# Patient Record
Sex: FEMALE | Race: WHITE | NOT HISPANIC OR LATINO | Employment: OTHER | URBAN - METROPOLITAN AREA
[De-identification: names, ages, dates, MRNs, and addresses within clinical notes are randomized per-mention and may not be internally consistent; named-entity substitution may affect disease eponyms.]

---

## 2019-04-30 ENCOUNTER — THERAPY VISIT (OUTPATIENT)
Dept: PHYSICAL THERAPY | Facility: CLINIC | Age: 74
End: 2019-04-30
Payer: COMMERCIAL

## 2019-04-30 DIAGNOSIS — M54.2 CERVICAL PAIN (NECK): ICD-10-CM

## 2019-04-30 PROCEDURE — 97112 NEUROMUSCULAR REEDUCATION: CPT | Mod: GP | Performed by: PHYSICAL THERAPIST

## 2019-04-30 PROCEDURE — 97161 PT EVAL LOW COMPLEX 20 MIN: CPT | Mod: GP | Performed by: PHYSICAL THERAPIST

## 2019-04-30 PROCEDURE — 97110 THERAPEUTIC EXERCISES: CPT | Mod: GP | Performed by: PHYSICAL THERAPIST
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
## 2019-05-07 ENCOUNTER — THERAPY VISIT (OUTPATIENT)
Dept: PHYSICAL THERAPY | Facility: CLINIC | Age: 74
End: 2019-05-07
Payer: COMMERCIAL

## 2019-05-07 DIAGNOSIS — M54.2 CERVICAL PAIN (NECK): ICD-10-CM

## 2019-05-07 PROCEDURE — 97140 MANUAL THERAPY 1/> REGIONS: CPT | Mod: GP | Performed by: PHYSICAL THERAPIST

## 2019-05-07 PROCEDURE — 97110 THERAPEUTIC EXERCISES: CPT | Mod: GP | Performed by: PHYSICAL THERAPIST

## 2019-05-13 ENCOUNTER — THERAPY VISIT (OUTPATIENT)
Dept: PHYSICAL THERAPY | Facility: CLINIC | Age: 74
End: 2019-05-13
Payer: COMMERCIAL

## 2019-05-13 DIAGNOSIS — M54.2 CERVICAL PAIN (NECK): ICD-10-CM

## 2019-05-13 PROCEDURE — 97140 MANUAL THERAPY 1/> REGIONS: CPT | Mod: GP | Performed by: PHYSICAL THERAPIST

## 2019-05-13 PROCEDURE — 97110 THERAPEUTIC EXERCISES: CPT | Mod: GP | Performed by: PHYSICAL THERAPIST

## 2019-05-13 PROCEDURE — 97112 NEUROMUSCULAR REEDUCATION: CPT | Mod: GP | Performed by: PHYSICAL THERAPIST

## 2019-05-28 ENCOUNTER — THERAPY VISIT (OUTPATIENT)
Dept: PHYSICAL THERAPY | Facility: CLINIC | Age: 74
End: 2019-05-28
Payer: COMMERCIAL

## 2019-05-28 DIAGNOSIS — M54.2 CERVICAL PAIN (NECK): ICD-10-CM

## 2019-05-28 PROCEDURE — 97112 NEUROMUSCULAR REEDUCATION: CPT | Mod: GP | Performed by: PHYSICAL THERAPIST

## 2019-05-28 PROCEDURE — 97140 MANUAL THERAPY 1/> REGIONS: CPT | Mod: GP | Performed by: PHYSICAL THERAPIST

## 2019-05-28 PROCEDURE — 97110 THERAPEUTIC EXERCISES: CPT | Mod: GP | Performed by: PHYSICAL THERAPIST

## 2019-06-13 ENCOUNTER — THERAPY VISIT (OUTPATIENT)
Dept: PHYSICAL THERAPY | Facility: CLINIC | Age: 74
End: 2019-06-13
Payer: COMMERCIAL

## 2019-06-13 DIAGNOSIS — M54.2 CERVICAL PAIN (NECK): ICD-10-CM

## 2019-06-13 PROCEDURE — 97112 NEUROMUSCULAR REEDUCATION: CPT | Mod: GP | Performed by: PHYSICAL THERAPIST

## 2019-06-13 PROCEDURE — 97140 MANUAL THERAPY 1/> REGIONS: CPT | Mod: GP | Performed by: PHYSICAL THERAPIST

## 2019-06-13 PROCEDURE — 97110 THERAPEUTIC EXERCISES: CPT | Mod: GP | Performed by: PHYSICAL THERAPIST

## 2019-06-17 ENCOUNTER — THERAPY VISIT (OUTPATIENT)
Dept: PHYSICAL THERAPY | Facility: CLINIC | Age: 74
End: 2019-06-17
Payer: COMMERCIAL

## 2019-06-17 DIAGNOSIS — M54.2 CERVICAL PAIN (NECK): ICD-10-CM

## 2019-06-17 PROCEDURE — 97110 THERAPEUTIC EXERCISES: CPT | Mod: GP | Performed by: PHYSICAL THERAPIST

## 2019-06-17 PROCEDURE — 97140 MANUAL THERAPY 1/> REGIONS: CPT | Mod: GP | Performed by: PHYSICAL THERAPIST

## 2019-06-17 PROCEDURE — 97112 NEUROMUSCULAR REEDUCATION: CPT | Mod: GP | Performed by: PHYSICAL THERAPIST

## 2019-06-17 NOTE — PROGRESS NOTES
Subjective:  HPI                    Objective:  System    Physical Exam    General     ROS    Assessment/Plan:    DISCHARGE REPORT    Progress reporting period is from 4/30/19 to 6/17/19    .       SUBJECTIVE  Subjective changes noted by patient:  Was in a lot of pain L shoulder and some in neck after being over-zealous doing yardwork trimming bushes and was very sore that evening.  Did a lot of therapy exercises and helped a lot.  Found knows limits and needs to listen to them.  Now has strategies that can do to prevent pain and lessen pain.    Current Pain level: 0/10.     Initial Pain level: 10/10.   Changes in function:  Yes (See Goal flowsheet attached for changes in current functional level)  Adverse reaction to treatment or activity: None    OBJECTIVE  Changes noted in objective findings:  Yes:  CROM flexion no loss, ext mod loss, rotation L 30% loss, R 15% loss, SB B 25% loss more tightness felt on L with R SB.    MMT elbow flex B 5/5, elbow ext B 5/5, shoulder IR 5/5 B, ER L 4-/5, R 5-/5, shoulder flexion L 4/5, R 5/5, scaption L 4/5, R 5/5, ABD L 4/5, R 5/5.    Hypertonic L>R UT.    Responds well to cervical retraction, retraction extension, thoracic extension and UT stretching.    Hypomobile cervical and upper thoracic spine, motion improves after sideglide mobilization.  Progressed to shoulder strengthening/scapular stabilization exercises.    Neck Disability Index improved from 18% to 10%.     ASSESSMENT/PLAN  Updated problem list and treatment plan: Diagnosis 1:  Neck pain    Pain -  home program  Decreased ROM/flexibility - home program  Decreased joint mobility - home program  Decreased strength - home program  Decreased proprioception - home program  Decreased function - home program  Impaired posture - home program  STG/LTGs have been met or progress has been made towards goals:  Yes (See Goal flow sheet completed today.)  Assessment of Progress: The patient's condition is improving.  Self  Management Plans:  Patient is independent in a home treatment program.  Patient is independent in self management of symptoms.  I have re-evaluated this patient and find that the nature, scope, duration and intensity of the therapy is appropriate for the medical condition of the patient.  Margy continues to require the following intervention to meet STG and LTG's:  PT intervention is no longer required to meet STG/LTG.    Recommendations:  This patient is ready to be discharged from therapy and continue their home treatment program.    Please refer to the daily flowsheet for treatment today, total treatment time and time spent performing 1:1 timed codes.

## 2019-06-17 NOTE — LETTER
[facility] ATHLETIC [facility] PHYSICAL THERAPY  [address]  [place] [facility] [address]  [phone]    2019    Re: Margy Myers   :   1945  MRN:  [id number]   REFERRING PHYSICIAN:   Li NAVARRO [facility] ATHLETIC [facility] PHYSICAL THERAPY    Date of Initial Evaluation:  2019  Visits:  Rxs Used: 6  Reason for Referral:  Cervical pain (neck)    DISCHARGE REPORT  Progress reporting period is from 19 to 19.       SUBJECTIVE  Subjective changes noted by patient: Was in a lot of pain L shoulder and some in neck after being over-zealous doing yardwork trimming bushes and was very sore that evening.  Did a lot of therapy exercises and helped a lot.  Found knows limits and needs to listen to them.  Now has strategies that can do to prevent pain and lessen pain.    Current Pain level: 0/10.     Initial Pain level: 10/10.   Changes in function: Yes (See Goal flowsheet attached for changes in current functional level)  Adverse reaction to treatment or activity: None    OBJECTIVE  Changes noted in objective findings: Yes:  CROM flexion no loss, ext mod loss, rotation L 30% loss, R 15% loss, SB B 25% loss more tightness felt on L with R SB.    MMT elbow flex B 5/5, elbow ext B 5/5, shoulder IR 5/5 B, ER L 4-/5, R 5-/5, shoulder flexion L 4/5, R 5/5, scaption L 4/5, R 5/5, ABD L 4/5, R 5/5.    Hypertonic L>R UT.    Responds well to cervical retraction, retraction extension, thoracic extension and UT stretching.    Hypomobile cervical and upper thoracic spine, motion improves after sideglide mobilization.  Progressed to shoulder strengthening/scapular stabilization exercises.    Neck Disability Index improved from 18% to 10%.     ASSESSMENT/PLAN  Updated problem list and treatment plan:   Diagnosis 1: Neck pain  Pain - home program  Decreased ROM/flexibility - home program  Decreased joint mobility - home program  Decreased  strength - home program  Re: Margy Myers   :   1945    Decreased proprioception - home program  Decreased function - home program  Impaired posture - home program  STG/LTGs have been met or progress has been made towards goals: Yes (See Goal flow sheet completed today.)  Assessment of Progress: The patient's condition is improving.  Self Management Plans: Patient is independent in a home treatment program.  Patient is independent in self management of symptoms.  I have re-evaluated this patient and find that the nature, scope, duration and intensity of the therapy is appropriate for the medical condition of the patient.  Margy continues to require the following intervention to meet STG and LTG's: PT intervention is no longer required to meet STG/LTG.    Recommendations:  This patient is ready to be discharged from therapy and continue their home treatment program.          Thank you for your referral.    INQUIRIES  Therapist: Aileen Estevez DPT  [facility] PHYSICAL THERAPY  [address] [address]  Phone: [phone]  Fax: [phone]

## 2021-02-23 ENCOUNTER — IMMUNIZATION (OUTPATIENT)
Dept: PEDIATRICS | Facility: CLINIC | Age: 76
End: 2021-02-23
Payer: COMMERCIAL

## 2021-02-23 PROCEDURE — 0001A PR COVID VAC PFIZER DIL RECON 30 MCG/0.3 ML IM: CPT

## 2021-02-23 PROCEDURE — 91300 PR COVID VAC PFIZER DIL RECON 30 MCG/0.3 ML IM: CPT

## 2021-03-16 ENCOUNTER — IMMUNIZATION (OUTPATIENT)
Dept: PEDIATRICS | Facility: CLINIC | Age: 76
End: 2021-03-16
Attending: INTERNAL MEDICINE
Payer: COMMERCIAL

## 2021-03-16 PROCEDURE — 91300 PR COVID VAC PFIZER DIL RECON 30 MCG/0.3 ML IM: CPT

## 2021-03-16 PROCEDURE — 0002A PR COVID VAC PFIZER DIL RECON 30 MCG/0.3 ML IM: CPT

## 2021-10-28 ENCOUNTER — THERAPY VISIT (OUTPATIENT)
Dept: PHYSICAL THERAPY | Facility: CLINIC | Age: 76
End: 2021-10-28
Payer: COMMERCIAL

## 2021-10-28 DIAGNOSIS — M54.2 NECK PAIN: ICD-10-CM

## 2021-10-28 DIAGNOSIS — G89.29 CHRONIC LEFT SHOULDER PAIN: ICD-10-CM

## 2021-10-28 DIAGNOSIS — M25.512 CHRONIC LEFT SHOULDER PAIN: ICD-10-CM

## 2021-10-28 PROCEDURE — 97112 NEUROMUSCULAR REEDUCATION: CPT | Mod: GP | Performed by: PHYSICAL THERAPIST

## 2021-10-28 PROCEDURE — 97161 PT EVAL LOW COMPLEX 20 MIN: CPT | Mod: GP | Performed by: PHYSICAL THERAPIST

## 2021-10-28 PROCEDURE — 97110 THERAPEUTIC EXERCISES: CPT | Mod: GP | Performed by: PHYSICAL THERAPIST
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
## 2021-11-09 ENCOUNTER — THERAPY VISIT (OUTPATIENT)
Dept: PHYSICAL THERAPY | Facility: CLINIC | Age: 76
End: 2021-11-09
Payer: COMMERCIAL

## 2021-11-09 DIAGNOSIS — M54.2 NECK PAIN: ICD-10-CM

## 2021-11-09 DIAGNOSIS — M25.512 CHRONIC LEFT SHOULDER PAIN: ICD-10-CM

## 2021-11-09 DIAGNOSIS — G89.29 CHRONIC LEFT SHOULDER PAIN: ICD-10-CM

## 2021-11-09 PROCEDURE — 97110 THERAPEUTIC EXERCISES: CPT | Mod: GP | Performed by: PHYSICAL THERAPIST

## 2021-11-09 PROCEDURE — 97140 MANUAL THERAPY 1/> REGIONS: CPT | Mod: GP | Performed by: PHYSICAL THERAPIST

## 2021-11-09 PROCEDURE — 97112 NEUROMUSCULAR REEDUCATION: CPT | Mod: GP | Performed by: PHYSICAL THERAPIST

## 2021-11-18 ENCOUNTER — THERAPY VISIT (OUTPATIENT)
Dept: PHYSICAL THERAPY | Facility: CLINIC | Age: 76
End: 2021-11-18
Payer: COMMERCIAL

## 2021-11-18 DIAGNOSIS — G89.29 CHRONIC LEFT SHOULDER PAIN: ICD-10-CM

## 2021-11-18 DIAGNOSIS — M54.2 NECK PAIN: ICD-10-CM

## 2021-11-18 DIAGNOSIS — M25.512 CHRONIC LEFT SHOULDER PAIN: ICD-10-CM

## 2021-11-18 PROCEDURE — 97110 THERAPEUTIC EXERCISES: CPT | Mod: GP | Performed by: PHYSICAL THERAPIST

## 2021-11-18 PROCEDURE — 97112 NEUROMUSCULAR REEDUCATION: CPT | Mod: GP | Performed by: PHYSICAL THERAPIST

## 2021-11-18 PROCEDURE — 97140 MANUAL THERAPY 1/> REGIONS: CPT | Mod: GP | Performed by: PHYSICAL THERAPIST

## 2021-12-02 ENCOUNTER — THERAPY VISIT (OUTPATIENT)
Dept: PHYSICAL THERAPY | Facility: CLINIC | Age: 76
End: 2021-12-02
Payer: COMMERCIAL

## 2021-12-02 DIAGNOSIS — M54.2 NECK PAIN: ICD-10-CM

## 2021-12-02 DIAGNOSIS — G89.29 CHRONIC LEFT SHOULDER PAIN: ICD-10-CM

## 2021-12-02 DIAGNOSIS — M25.512 CHRONIC LEFT SHOULDER PAIN: ICD-10-CM

## 2021-12-02 PROCEDURE — 97140 MANUAL THERAPY 1/> REGIONS: CPT | Mod: GP | Performed by: PHYSICAL THERAPIST

## 2021-12-02 PROCEDURE — 97112 NEUROMUSCULAR REEDUCATION: CPT | Mod: GP | Performed by: PHYSICAL THERAPIST

## 2021-12-02 PROCEDURE — 97110 THERAPEUTIC EXERCISES: CPT | Mod: GP | Performed by: PHYSICAL THERAPIST

## 2021-12-09 ENCOUNTER — THERAPY VISIT (OUTPATIENT)
Dept: PHYSICAL THERAPY | Facility: CLINIC | Age: 76
End: 2021-12-09
Payer: COMMERCIAL

## 2021-12-09 DIAGNOSIS — M54.2 NECK PAIN: ICD-10-CM

## 2021-12-09 DIAGNOSIS — G89.29 CHRONIC LEFT SHOULDER PAIN: ICD-10-CM

## 2021-12-09 DIAGNOSIS — M25.512 CHRONIC LEFT SHOULDER PAIN: ICD-10-CM

## 2021-12-09 PROCEDURE — 97140 MANUAL THERAPY 1/> REGIONS: CPT | Mod: GP | Performed by: PHYSICAL THERAPIST

## 2021-12-09 PROCEDURE — 97112 NEUROMUSCULAR REEDUCATION: CPT | Mod: GP | Performed by: PHYSICAL THERAPIST

## 2021-12-09 PROCEDURE — 97110 THERAPEUTIC EXERCISES: CPT | Mod: GP | Performed by: PHYSICAL THERAPIST

## 2021-12-16 ENCOUNTER — THERAPY VISIT (OUTPATIENT)
Dept: PHYSICAL THERAPY | Facility: CLINIC | Age: 76
End: 2021-12-16
Payer: COMMERCIAL

## 2021-12-16 DIAGNOSIS — M25.512 CHRONIC LEFT SHOULDER PAIN: ICD-10-CM

## 2021-12-16 DIAGNOSIS — G89.29 CHRONIC LEFT SHOULDER PAIN: ICD-10-CM

## 2021-12-16 DIAGNOSIS — M54.2 NECK PAIN: ICD-10-CM

## 2021-12-16 PROCEDURE — 97110 THERAPEUTIC EXERCISES: CPT | Mod: GP | Performed by: PHYSICAL THERAPIST

## 2021-12-16 PROCEDURE — 97112 NEUROMUSCULAR REEDUCATION: CPT | Mod: GP | Performed by: PHYSICAL THERAPIST

## 2021-12-16 PROCEDURE — 97140 MANUAL THERAPY 1/> REGIONS: CPT | Mod: GP | Performed by: PHYSICAL THERAPIST

## 2021-12-30 ENCOUNTER — THERAPY VISIT (OUTPATIENT)
Dept: PHYSICAL THERAPY | Facility: CLINIC | Age: 76
End: 2021-12-30
Payer: COMMERCIAL

## 2021-12-30 DIAGNOSIS — G89.29 CHRONIC LEFT SHOULDER PAIN: ICD-10-CM

## 2021-12-30 DIAGNOSIS — M54.2 NECK PAIN: ICD-10-CM

## 2021-12-30 DIAGNOSIS — M25.512 CHRONIC LEFT SHOULDER PAIN: ICD-10-CM

## 2021-12-30 PROCEDURE — 97112 NEUROMUSCULAR REEDUCATION: CPT | Mod: GP | Performed by: PHYSICAL THERAPIST

## 2021-12-30 PROCEDURE — 97110 THERAPEUTIC EXERCISES: CPT | Mod: GP | Performed by: PHYSICAL THERAPIST

## 2021-12-30 PROCEDURE — 97140 MANUAL THERAPY 1/> REGIONS: CPT | Mod: GP | Performed by: PHYSICAL THERAPIST

## 2021-12-30 NOTE — PROGRESS NOTES
Subjective:  HPI  Physical Exam                    Objective:  System    Physical Exam    General     ROS    Assessment/Plan:    PROGRESS  REPORT    Progress reporting period is from 10/28/21 to 12/30/21, 7 visits.       SUBJECTIVE  Subjective changes noted by patient:  Two week lapse in therapy and reporting overall the neck is doing well other than did 20 reps of neck rotation and sidebending (not prescribed by therapist) and thinks this increased soreness.  Cervical retraction/extension does not hurt, overall has helped, but if sore after rotations not much change.  Reports previously could not feel the left side of body, and now is starting to feel the stretches on that side.  Has improved awareness of posture, needing to take breaks while cooking/reading etc to stretch and prevent pain.  Did not have to do a lot of cooking for [place], but this usually is when neck gets very sore.  Wants to continue therapy.    Current Pain level: 0/10.     Initial Pain level: 8/10.   Changes in function:  Yes (See Goal flowsheet attached for changes in current functional level)  Adverse reaction to treatment or activity: None    OBJECTIVE  Changes noted in objective findings:  Yes:  CROM flex min loss, ext 30% loss, rot L min loss, R min to nil loss, SB B no loss more tight to L.    Patient has responded well to retraction/extension principles, thoracic extension and scapular stabilization.     MMT elbow flex B 5/5, elbow ext B 5/5, Shoulder IR B 5/5, ER L 4-/5, R 5/5, scaption L 4/5, R 5/5, shoulder flexion L 4/5, R 5/5, thumb ext L 4/5, R 5/5.    Improved posture awareness.    Hypomobile C7-T3 spinous process.    Neck Disability Index improved from 20% to 16%.     ASSESSMENT/PLAN  Updated problem list and treatment plan: Diagnosis 1:  L neck pain, chronic L shoulder pain    Pain -  manual therapy, self management, education, directional preference exercise and home program  Decreased ROM/flexibility - manual therapy,  therapeutic exercise, therapeutic activity and home program  Decreased joint mobility - manual therapy, therapeutic exercise, therapeutic activity and home program  Decreased strength - therapeutic exercise, therapeutic activities and home program  Decreased function - therapeutic activities and home program  Impaired posture - neuro re-education, therapeutic activities and home program  STG/LTGs have been met or progress has been made towards goals:  Yes (See Goal flow sheet completed today.)  Assessment of Progress: The patient's condition is improving.  Self Management Plans:  Patient has been instructed in a home treatment program.  Patient  has been instructed in self management of symptoms.  I have re-evaluated this patient and find that the nature, scope, duration and intensity of the therapy is appropriate for the medical condition of the patient.  Margy continues to require the following intervention to meet STG and LTG's:  PT    Recommendations:  This patient would benefit from continued therapy.     Frequency:  2 X a month, once daily  Duration:  for 2.5 months        Please refer to the daily flowsheet for treatment today, total treatment time and time spent performing 1:1 timed codes.

## 2021-12-30 NOTE — LETTER
CASE [facility]  [address]  [facility] [address]  [phone]    2022    Re: Margy Myers   :   1945  MRN:  [id number]   REFERRING PHYSICIAN:   Margarita WILLOUGHBY [facility]  Date of Initial Evaluation:  10/28/21  Visits:  Rxs Used: 7  Reason for Referral:     Neck pain  Chronic left shoulder pain    PROGRESS  REPORT  Progress reporting period is from 10/28/21 to 21, 7 visits.       SUBJECTIVE  Subjective changes noted by patient:  Two week lapse in therapy and reporting overall the neck is doing well other than did 20 reps of neck rotation and sidebending (not prescribed by therapist) and thinks this increased soreness.  Cervical retraction/extension does not hurt, overall has helped, but if sore after rotations not much change.  Reports previously could not feel the left side of body, and now is starting to feel the stretches on that side.  Has improved awareness of posture, needing to take breaks while cooking/reading etc to stretch and prevent pain.  Did not have to do a lot of cooking for [place], but this usually is when neck gets very sore.  Wants to continue therapy.    Current Pain level: 0/10.     Initial Pain level: 8/10.   Changes in function:  Yes (See Goal flowsheet attached for changes in current functional level)  Adverse reaction to treatment or activity: None      OBJECTIVE  Changes noted in objective findings:  Yes:  CROM flex min loss, ext 30% loss, rot L min loss, R min to nil loss, SB B no loss more tight to L.    Patient has responded well to retraction/extension principles, thoracic extension and scapular stabilization.     MMT elbow flex B 5/5, elbow ext B 5/5, Shoulder IR B 5/5, ER L 4-/5, R 5/5, scaption L 4/5, R 5/5, shoulder flexion L 4/5, R 5/5, thumb ext L 4/5, R 5/5.    Improved posture awareness.    Hypomobile C7-T3 spinous process.     Neck Disability Index improved from 20% to 16%.             Re: Margy Myers   :   1945    ASSESSMENT/PLAN  Updated problem list and treatment plan: Diagnosis 1:  L neck pain, chronic L shoulder pain  Pain -  manual therapy, self management, education, directional preference exercise and home program  Decreased ROM/flexibility - manual therapy, therapeutic exercise, therapeutic activity and home program  Decreased joint mobility - manual therapy, therapeutic exercise, therapeutic activity and home program  Decreased strength - therapeutic exercise, therapeutic activities and home program  Decreased function - therapeutic activities and home program  Impaired posture - neuro re-education, therapeutic activities and home program  STG/LTGs have been met or progress has been made towards goals:  Yes (See Goal flow sheet completed today.)  Assessment of Progress: The patient's condition is improving.  Self Management Plans:  Patient has been instructed in a home treatment program.  Patient  has been instructed in self management of symptoms.  I have re-evaluated this patient and find that the nature, scope, duration and intensity of the therapy is appropriate for the medical condition of the patient.  Margy continues to require the following intervention to meet STG and LTG's:  PT    Recommendations:  This patient would benefit from continued therapy.     Frequency:  2 X a month, once daily  Duration:  for 2.5 months    Thank you for your referral.    INQUIRIES  Therapist: Aileen Estevez PT  [facility] [place]  [address] [place] [address]  Phone: [phone]  Fax: [phone]

## 2022-01-13 ENCOUNTER — THERAPY VISIT (OUTPATIENT)
Dept: PHYSICAL THERAPY | Facility: CLINIC | Age: 77
End: 2022-01-13
Payer: COMMERCIAL

## 2022-01-13 DIAGNOSIS — M54.2 NECK PAIN: ICD-10-CM

## 2022-01-13 DIAGNOSIS — G89.29 CHRONIC LEFT SHOULDER PAIN: ICD-10-CM

## 2022-01-13 DIAGNOSIS — M25.512 CHRONIC LEFT SHOULDER PAIN: ICD-10-CM

## 2022-01-13 PROCEDURE — 97110 THERAPEUTIC EXERCISES: CPT | Mod: GP | Performed by: PHYSICAL THERAPIST

## 2022-01-13 PROCEDURE — 97112 NEUROMUSCULAR REEDUCATION: CPT | Mod: GP | Performed by: PHYSICAL THERAPIST

## 2022-01-13 PROCEDURE — 97140 MANUAL THERAPY 1/> REGIONS: CPT | Mod: GP | Performed by: PHYSICAL THERAPIST

## 2022-01-27 ENCOUNTER — THERAPY VISIT (OUTPATIENT)
Dept: PHYSICAL THERAPY | Facility: CLINIC | Age: 77
End: 2022-01-27
Payer: COMMERCIAL

## 2022-01-27 DIAGNOSIS — M54.2 NECK PAIN: ICD-10-CM

## 2022-01-27 DIAGNOSIS — G89.29 CHRONIC LEFT SHOULDER PAIN: ICD-10-CM

## 2022-01-27 DIAGNOSIS — M25.512 CHRONIC LEFT SHOULDER PAIN: ICD-10-CM

## 2022-01-27 PROCEDURE — 97140 MANUAL THERAPY 1/> REGIONS: CPT | Mod: GP | Performed by: PHYSICAL THERAPIST

## 2022-01-27 PROCEDURE — 97112 NEUROMUSCULAR REEDUCATION: CPT | Mod: GP | Performed by: PHYSICAL THERAPIST

## 2022-01-27 PROCEDURE — 97110 THERAPEUTIC EXERCISES: CPT | Mod: GP | Performed by: PHYSICAL THERAPIST

## 2022-02-17 ENCOUNTER — THERAPY VISIT (OUTPATIENT)
Dept: PHYSICAL THERAPY | Facility: CLINIC | Age: 77
End: 2022-02-17
Payer: COMMERCIAL

## 2022-02-17 DIAGNOSIS — M25.512 CHRONIC LEFT SHOULDER PAIN: ICD-10-CM

## 2022-02-17 DIAGNOSIS — G89.29 CHRONIC LEFT SHOULDER PAIN: ICD-10-CM

## 2022-02-17 DIAGNOSIS — M54.2 NECK PAIN: ICD-10-CM

## 2022-02-17 PROCEDURE — 97140 MANUAL THERAPY 1/> REGIONS: CPT | Mod: GP | Performed by: PHYSICAL THERAPIST

## 2022-02-17 PROCEDURE — 97110 THERAPEUTIC EXERCISES: CPT | Mod: GP | Performed by: PHYSICAL THERAPIST

## 2022-02-17 NOTE — PROGRESS NOTES
Subjective:  HPI  Physical Exam                    Objective:  System    Physical Exam    General     ROS    Assessment/Plan:    PROGRESS  REPORT    Progress reporting period is from 12/30/21 to 2/17/22.       SUBJECTIVE  Subjective changes noted by patient:  3 week lapse in therapy and reporting last night was able to peel a lot of vegetables without any neck issues so was pleased with this.  Sometimes does have intermittent pain after cooking but has learned that needs to be proactive about doing home exercise program as it does help.  Is more cognizant of posture with walking.  Patient reports not yet ready to discharge from therapy.    Current Pain level: 0/10.     Initial Pain level: 8/10.   Changes in function:  Yes (See Goal flowsheet attached for changes in current functional level)  Adverse reaction to treatment or activity: None    OBJECTIVE  Changes noted in objective findings:  Yes:   CROM flex min loss, ext 40% loss (improves to 30% loss after exercise and MT), rot L min loss, R min to nil loss, SB B no loss more tight to L.    MMT elbow flex B 5/5, elbow ext B 5/5, Shoulder IR B 5/5, ER L 4-/5, R 5/5, scaption L 4+/5, R 5/5, shoulder flexion L 4/5, R 5/5, thumb ext L 4/5, R 5/5.    Significant improved posture awareness and pain management strategies.    Neck Disability Index improved from 16% to 14% (and overall from 20%)     ASSESSMENT/PLAN  Updated problem list and treatment plan: Diagnosis 1:  L neck pain, chronic L shoulder pain    Pain -  manual therapy, self management, education, directional preference exercise and home program  Decreased ROM/flexibility - manual therapy, therapeutic exercise, therapeutic activity and home program  Decreased joint mobility - manual therapy, therapeutic exercise, therapeutic activity and home program  Decreased strength - therapeutic exercise, therapeutic activities and home program  Decreased function - therapeutic activities and home program  Impaired posture  - neuro re-education, therapeutic activities and home program  STG/LTGs have been met or progress has been made towards goals:  Yes (See Goal flow sheet completed today.)  Assessment of Progress: The patient's condition is improving.  Self Management Plans:  Patient has been instructed in a home treatment program.  Patient  has been instructed in self management of symptoms.  I have re-evaluated this patient and find that the nature, scope, duration and intensity of the therapy is appropriate for the medical condition of the patient.  Margy continues to require the following intervention to meet STG and LTG's:  PT    Recommendations:  This patient would benefit from continued therapy.     Frequency:  1 X a month, once daily  Duration:  for 2 months        Please refer to the daily flowsheet for treatment today, total treatment time and time spent performing 1:1 timed codes.

## 2022-03-12 NOTE — LETTER
CASE [facility]  [address]  [place] [address]  [phone]    2022    Re: Margy Myers   :   1945  MRN:  [id number]   REFERRING PHYSICIAN:   Margarita WILLOUGHBY [facility]  Date of Initial Evaluation:  21  Visits:  Rxs Used: 10  Reason for Referral:     Neck pain  Chronic left shoulder pain    PROGRESS  REPORT  Progress reporting period is from 21 to 22.       SUBJECTIVE  Subjective changes noted by patient:  3 week lapse in therapy and reporting last night was able to peel a lot of vegetables without any neck issues so was pleased with this.  Sometimes does have intermittent pain after cooking but has learned that needs to be proactive about doing home exercise program as it does help.  Is more cognizant of posture with walking.  Patient reports not yet ready to discharge from therapy.    Current Pain level: 0/10.     Initial Pain level: 8/10.   Changes in function:  Yes (See Goal flowsheet attached for changes in current functional level)  Adverse reaction to treatment or activity: None    OBJECTIVE  Changes noted in objective findings:  Yes:   CROM flex min loss, ext 40% loss (improves to 30% loss after exercise and MT), rot L min loss, R min to nil loss, SB B no loss more tight to L.    MMT elbow flex B 5/5, elbow ext B 5/5, Shoulder IR B 5/5, ER L 4-/5, R 5/5, scaption L 4+/5, R 5/5, shoulder flexion L 4/5, R 5/5, thumb ext L 4/5, R 5/5.    Significant improved posture awareness and pain management strategies.    Neck Disability Index improved from 16% to 14% (and overall from 20%)     ASSESSMENT/PLAN  Updated problem list and treatment plan: Diagnosis 1:  L neck pain, chronic L shoulder pain    Pain -  manual therapy, self management, education, directional preference exercise and home program  Decreased ROM/flexibility - manual therapy, therapeutic  exercise, therapeutic activity and home program  Decreased joint mobility - manual therapy, therapeutic exercise, therapeutic activity and home program  Decreased strength - therapeutic exercise, therapeutic activities and home program  Decreased function - therapeutic activities and home program  Re: Margy Myers   :   1945    Impaired posture - neuro re-education, therapeutic activities and home program  STG/LTGs have been met or progress has been made towards goals:  Yes (See Goal flow sheet completed today.)  Assessment of Progress: The patient's condition is improving.  Self Management Plans:  Patient has been instructed in a home treatment program.  Patient  has been instructed in self management of symptoms.  I have re-evaluated this patient and find that the nature, scope, duration and intensity of the therapy is appropriate for the medical condition of the patient.  Margy continues to require the following intervention to meet STG and LTG's:  PT    Recommendations:  This patient would benefit from continued therapy.     Frequency:  1 X a month, once daily  Duration:  for 2 months    Thank you for your referral.    INQUIRIES  Therapist: Aileen Estevez PT  [facility] SERVICES [address] [address]  Phone: [phone]  Fax: [phone]      [facility]

## 2022-03-17 ENCOUNTER — THERAPY VISIT (OUTPATIENT)
Dept: PHYSICAL THERAPY | Facility: CLINIC | Age: 77
End: 2022-03-17
Payer: COMMERCIAL

## 2022-03-17 DIAGNOSIS — M54.2 NECK PAIN: ICD-10-CM

## 2022-03-17 DIAGNOSIS — M25.512 CHRONIC LEFT SHOULDER PAIN: ICD-10-CM

## 2022-03-17 DIAGNOSIS — G89.29 CHRONIC LEFT SHOULDER PAIN: ICD-10-CM

## 2022-03-17 PROCEDURE — 97110 THERAPEUTIC EXERCISES: CPT | Mod: GP | Performed by: PHYSICAL THERAPIST

## 2022-03-17 PROCEDURE — 97140 MANUAL THERAPY 1/> REGIONS: CPT | Mod: GP | Performed by: PHYSICAL THERAPIST

## 2022-03-29 ENCOUNTER — THERAPY VISIT (OUTPATIENT)
Dept: PHYSICAL THERAPY | Facility: CLINIC | Age: 77
End: 2022-03-29
Payer: COMMERCIAL

## 2022-03-29 DIAGNOSIS — M25.512 CHRONIC LEFT SHOULDER PAIN: ICD-10-CM

## 2022-03-29 DIAGNOSIS — G89.29 CHRONIC LEFT SHOULDER PAIN: ICD-10-CM

## 2022-03-29 DIAGNOSIS — M54.2 NECK PAIN: ICD-10-CM

## 2022-03-29 PROCEDURE — 97110 THERAPEUTIC EXERCISES: CPT | Mod: GP | Performed by: PHYSICAL THERAPIST

## 2022-03-29 PROCEDURE — 97140 MANUAL THERAPY 1/> REGIONS: CPT | Mod: GP | Performed by: PHYSICAL THERAPIST

## 2022-03-29 NOTE — PROGRESS NOTES
Subjective:  HPI  Physical Exam                    Objective:  System    Physical Exam    General     ROS    Assessment/Plan:    PROGRESS  REPORT    Progress reporting period is from 2/17/22 to 3/29/22.       SUBJECTIVE  Subjective changes noted by patient:  No pain today, but last week did meal with 40 min prep of stirring, chopping, cutting etc and couldn't get through it and was in so much pain after that didn't want to eat it.  Was rolling dough another time and that was ok.  Tried to do exercises before and did after but pain was excruciating and so it lessened pain but didn't fully take it away.  Pain stayed only in the neck, was never down by shoulder blade (so this is improvement).  Is trying to space out some meal prep to lessen time needs to do all at once (learning to chunk out activities).    Base of neck pain is the worst, but feels really good the day after therapy treatments.  Also notes no longer has the horrific knot in the neck.  Would like to continue therapy a few more sessions to progress to independence.    Current Pain level: 0/10.      Initial Pain level: 8/10.   Changes in function:  Yes (See Goal flowsheet attached for changes in current functional level)  Adverse reaction to treatment or activity: activity: no flare up with exercise, but prolonged cooking activity    OBJECTIVE  Changes noted in objective findings:  Yes:  CROM flex no loss, ext mod loss (50% loss), rot R min loss, rot L mod loss, SB R min loss, SB L mod loss.    MMT elbow flex B 5/5, elbow ext B 5/5, Shoulder IR B 5/5, ER L 4-/5, R 5/5, scaption L 4+/5, R 5/5, shoulder flexion L 4+/5, R 5/5, ABD L 4+/5, R 5/5. thumb ext L 4/5, R 5/5.    Cues for proper cervical extension with finger overpressure at upper thoracic region where is tight.      Patient responds extremely well to SNAGS/NAGS mobilization with movement into extension, attempting to re-create this mob at home with tennis ball thoracic mobilization.  Extension  improves to min loss after mobilizations.    Tendency for slight flexed neck at rest but overall significant improvement with posture awareness.    Neck Pain and Disability Index changed from 14% to 16%.     ASSESSMENT/PLAN  Updated problem list and treatment plan: Diagnosis 1:  L neck pain, chronic L shoulder pain    Pain -  manual therapy, self management, education, directional preference exercise and home program  Decreased ROM/flexibility - manual therapy, therapeutic exercise, therapeutic activity and home program  Decreased joint mobility - manual therapy, therapeutic exercise, therapeutic activity and home program  Decreased strength - therapeutic exercise, therapeutic activities and home program  Decreased function - therapeutic activities and home program  Impaired posture - neuro re-education, therapeutic activities and home program  STG/LTGs have been met or progress has been made towards goals:  Yes (See Goal flow sheet completed today.)  Assessment of Progress: The patient's condition is improving.  Working toward self management without needing therapist to perform mobilizations.  Self Management Plans:  Patient has been instructed in a home treatment program.  Patient  has been instructed in self management of symptoms.  I have re-evaluated this patient and find that the nature, scope, duration and intensity of the therapy is appropriate for the medical condition of the patient.  Margy continues to require the following intervention to meet STG and LTG's:  PT    Recommendations:  This patient would benefit from continued therapy.     Frequency:  2 X a month, once daily  Duration:  for 2 months        Please refer to the daily flowsheet for treatment today, total treatment time and time spent performing 1:1 timed codes.

## 2022-03-29 NOTE — LETTER
CASE [facility]  [address]  [place] [facility] [address]  [phone]    2022    Re: Margy Myers   :   1945  MRN:  [id number]   REFERRING PHYSICIAN:   Margarita WILLOUGHBY [facility]    Date of Initial Evaluation:  10/28/2021  Visits:  Rxs Used: 12  Reason for Referral:     Neck pain  Chronic left shoulder pain    EVALUATION SUMMARY    PROGRESS  REPORT    Progress reporting period is from 22 to 3/29/22.       SUBJECTIVE  Subjective changes noted by patient:  No pain today, but last week did meal with 40 min prep of stirring, chopping, cutting etc and couldn't get through it and was in so much pain after that didn't want to eat it.  Was rolling dough another time and that was ok.  Tried to do exercises before and did after but pain was excruciating and so it lessened pain but didn't fully take it away.  Pain stayed only in the neck, was never down by shoulder blade (so this is improvement).  Is trying to space out some meal prep to lessen time needs to do all at once (learning to chunk out activities).    Base of neck pain is the worst, but feels really good the day after therapy treatments.  Also notes no longer has the horrific knot in the neck.  Would like to continue therapy a few more sessions to progress to independence.    Current Pain level: 0/10.      Initial Pain level: 8/10.   Changes in function:  Yes (See Goal flowsheet attached for changes in current functional level)  Adverse reaction to treatment or activity: activity: no flare up with exercise, but prolonged cooking activity    OBJECTIVE  Changes noted in objective findings:  Yes:  CROM flex no loss, ext mod loss (50% loss), rot R min loss, rot L mod loss, SB R min loss, SB L mod loss.    MMT elbow flex B 5/5, elbow ext B 5/5, Shoulder IR B 5/5, ER L 4-/5, R 5/5, scaption L 4+/5, R 5/5, shoulder flexion L 4+/5, R  5/5, ABD L 4+/5, R 5/5. thumb ext L 4/5, R 5/5.    Cues for proper cervical extension with finger overpressure at upper thoracic region where is tight.      Patient responds extremely well to SNAGS/NAGS mobilization with movement into extension, attempting to re-create this mob at home with tennis ball thoracic mobilization.  Extension improves to min loss after mobilizations.    Tendency for slight flexed neck at rest but overall significant improvement with posture awareness.    Neck Pain and Disability Index changed from 14% to 16%.     ASSESSMENT/PLAN  Updated problem list and treatment plan: Diagnosis 1:  L neck pain, chronic L shoulder pain    Pain -  manual therapy, self management, education, directional preference exercise and home program  Decreased ROM/flexibility - manual therapy, therapeutic exercise, therapeutic activity and home program  Decreased joint mobility - manual therapy, therapeutic exercise, therapeutic activity and home program  Decreased strength - therapeutic exercise, therapeutic activities and home program  Decreased function - therapeutic activities and home program  Impaired posture - neuro re-education, therapeutic activities and home program  STG/LTGs have been met or progress has been made towards goals:  Yes (See Goal flow sheet completed today.)  Assessment of Progress: The patient's condition is improving.  Working toward self management without needing therapist to perform mobilizations.  Self Management Plans:  Patient has been instructed in a home treatment program.  Patient  has been instructed in self management of symptoms.  I have re-evaluated this patient and find that the nature, scope, duration and intensity of the therapy is appropriate for the medical condition of the patient.  Margy continues to require the following intervention to meet STG and LTG's:  PT    Recommendations:  This patient would benefit from continued therapy.     Frequency:  2 X a month, once  daily  Duration:  for 2 months    Please refer to the daily flowsheet for treatment today, total treatment time and time spent performing 1:1 timed codes.    Thank you for your referral.    INQUIRIES  Therapist: Aileen Estevez Dpt, Cert MDT,[address] [address]  Phone: [phone]  Fax: [phone]

## 2022-04-12 ENCOUNTER — THERAPY VISIT (OUTPATIENT)
Dept: PHYSICAL THERAPY | Facility: CLINIC | Age: 77
End: 2022-04-12
Payer: COMMERCIAL

## 2022-04-12 DIAGNOSIS — M54.2 NECK PAIN: Primary | ICD-10-CM

## 2022-04-12 DIAGNOSIS — M25.512 CHRONIC LEFT SHOULDER PAIN: ICD-10-CM

## 2022-04-12 DIAGNOSIS — G89.29 CHRONIC LEFT SHOULDER PAIN: ICD-10-CM

## 2022-04-12 PROCEDURE — 97140 MANUAL THERAPY 1/> REGIONS: CPT | Mod: GP | Performed by: PHYSICAL THERAPIST

## 2022-04-12 PROCEDURE — 97110 THERAPEUTIC EXERCISES: CPT | Mod: GP | Performed by: PHYSICAL THERAPIST

## 2022-04-12 PROCEDURE — 97112 NEUROMUSCULAR REEDUCATION: CPT | Mod: GP | Performed by: PHYSICAL THERAPIST

## 2022-04-26 ENCOUNTER — THERAPY VISIT (OUTPATIENT)
Dept: PHYSICAL THERAPY | Facility: CLINIC | Age: 77
End: 2022-04-26
Payer: COMMERCIAL

## 2022-04-26 DIAGNOSIS — M54.2 NECK PAIN: Primary | ICD-10-CM

## 2022-04-26 DIAGNOSIS — M25.512 CHRONIC LEFT SHOULDER PAIN: ICD-10-CM

## 2022-04-26 DIAGNOSIS — G89.29 CHRONIC LEFT SHOULDER PAIN: ICD-10-CM

## 2022-04-26 PROCEDURE — 97110 THERAPEUTIC EXERCISES: CPT | Mod: GP | Performed by: PHYSICAL THERAPIST

## 2022-04-26 PROCEDURE — 97112 NEUROMUSCULAR REEDUCATION: CPT | Mod: GP | Performed by: PHYSICAL THERAPIST

## 2022-06-21 ENCOUNTER — THERAPY VISIT (OUTPATIENT)
Dept: PHYSICAL THERAPY | Facility: CLINIC | Age: 77
End: 2022-06-21
Payer: COMMERCIAL

## 2022-06-21 DIAGNOSIS — G89.29 CHRONIC LEFT SHOULDER PAIN: ICD-10-CM

## 2022-06-21 DIAGNOSIS — M54.2 NECK PAIN: Primary | ICD-10-CM

## 2022-06-21 DIAGNOSIS — M25.512 CHRONIC LEFT SHOULDER PAIN: ICD-10-CM

## 2022-06-21 PROCEDURE — 97110 THERAPEUTIC EXERCISES: CPT | Mod: GP | Performed by: PHYSICAL THERAPIST

## 2022-06-21 PROCEDURE — 97112 NEUROMUSCULAR REEDUCATION: CPT | Mod: GP | Performed by: PHYSICAL THERAPIST

## 2022-06-21 NOTE — LETTER
CASE [facility]  [address]  [address]  [facility] [address]  [phone]    2022    Re: Margy Myers   :   1945  MRN:  [id number]   REFERRING PHYSICIAN:   Margarita WILLOUGHBY [facility]  Date of Initial Evaluation:  10/28/22  Visits:  Rxs Used: 15  Reason for Referral:     Neck pain  Chronic left shoulder pain    DISCHARGE REPORT  Progress reporting period is from 10/28/22 to 22.       SUBJECTIVE  Subjective changes noted by patient:  8 week lapse in therapy and patient reporting has been very active (gardening) and is doing exercises and can do all wants to do.  Was able to lift 2 bags of mulch at store.  Decides to sit while chopping for cooking as is too painful to stand, but is now ok with these modifications.  No neck pain today but a little soreness in L anterior shoulder.  Tennis ball SOR really helps lessen pain and exercies for neck help to lessen shoulder pain. Is much more cognizant of neck posture.  Is very diligent with HEP and really appreciates all the techniques and strategies has learned for treatment.  Feels can progress independently in home exercise program.    Current Pain level: 3/10.      Initial Pain level: 8/10.   Changes in function:  Yes (See Goal flowsheet attached for changes in current functional level)  Adverse reaction to treatment or activity: None    OBJECTIVE  Changes noted in objective findings:  Yes:  CROM flex no loss, ext min loss (60% loss), rot R no loss, L min loss, SB min loss.    Shoulder AROM flexion B 120, ABD B 140, ER L 50, R 70, ext + IR L T12, R T8.    MMT elbow flexion B 5/5, elbow ext B 5/5, IR B 5-/5, ER L 4-/5, R 4/5, shoulder scaption B 5-/5, flexion L 4+/5, R 5-/5, ABD B 4+/5, thumb ext L 4+/5, R 5/5.   Neck Disability Index improved from 20% to 12%.     ASSESSMENT/PLAN  Updated problem list and treatment plan: Diagnosis 1:  L  neck pain, chronic L shoulder pain  Pain -  home program  Decreased ROM/flexibility - home program  Decreased joint mobility - home program  Decreased strength - home program  Decreased function - home program  Re: Margy Myers   :   1945    Impaired posture - home program  STG/LTGs have been met or progress has been made towards goals:  Yes (See Goal flow sheet completed today.)  Assessment of Progress: The patient's condition is improving.  Self Management Plans:  Patient is independent in a home treatment program.  Patient is independent in self management of symptoms.  I have re-evaluated this patient and find that the nature, scope, duration and intensity of the therapy is appropriate for the medical condition of the patient.  Margy continues to require the following intervention to meet STG and LTG's:  PT intervention is no longer required to meet STG/LTG.    Recommendations:  This patient is ready to be discharged from therapy and continue their home treatment program.      Thank you for your referral.    INQUIRIES  Therapist: Aileen Estevez PT  [facility] SERVICES [place]  [address] [place] [address]  Phone: [phone]  Fax: [phone]

## 2022-06-21 NOTE — PROGRESS NOTES
Subjective:  HPI  Physical Exam                    Objective:  System    Physical Exam    General     ROS    Assessment/Plan:    DISCHARGE REPORT    Progress reporting period is from 10/28/22 to 6/21/22.       SUBJECTIVE  Subjective changes noted by patient:  8 week lapse in therapy and patient reporting has been very active (gardening) and is doing exercises and can do all wants to do.  Was able to lift 2 bags of mulch at store.  Decides to sit while chopping for cooking as is too painful to stand, but is now ok with these modifications.  No neck pain today but a little soreness in L anterior shoulder.  Tennis ball SOR really helps lessen pain and exercies for neck help to lessen shoulder pain. Is much more cognizant of neck posture.  Is very diligent with HEP and really appreciates all the techniques and strategies has learned for treatment.  Feels can progress independently in home exercise program.    Current Pain level: 3/10.      Initial Pain level: 8/10.   Changes in function:  Yes (See Goal flowsheet attached for changes in current functional level)  Adverse reaction to treatment or activity: None    OBJECTIVE  Changes noted in objective findings:  Yes:  CROM flex no loss, ext min loss (60% loss), rot R no loss, L min loss, SB min loss.    Shoulder AROM flexion B 120, ABD B 140, ER L 50, R 70, ext + IR L T12, R T8.    MMT elbow flexion B 5/5, elbow ext B 5/5, IR B 5-/5, ER L 4-/5, R 4/5, shoulder scaption B 5-/5, flexion L 4+/5, R 5-/5, ABD B 4+/5, thumb ext L 4+/5, R 5/5.   Neck Disability Index improved from 20% to 12%.     ASSESSMENT/PLAN  Updated problem list and treatment plan: Diagnosis 1:  L neck pain, chronic L shoulder pain    Pain -  home program  Decreased ROM/flexibility - home program  Decreased joint mobility - home program  Decreased strength - home program  Decreased function - home program  Impaired posture - home program  STG/LTGs have been met or progress has been made towards goals:  Yes  (See Goal flow sheet completed today.)  Assessment of Progress: The patient's condition is improving.  Self Management Plans:  Patient is independent in a home treatment program.  Patient is independent in self management of symptoms.  I have re-evaluated this patient and find that the nature, scope, duration and intensity of the therapy is appropriate for the medical condition of the patient.  Margy continues to require the following intervention to meet STG and LTG's:  PT intervention is no longer required to meet STG/LTG.    Recommendations:  This patient is ready to be discharged from therapy and continue their home treatment program.    Please refer to the daily flowsheet for treatment today, total treatment time and time spent performing 1:1 timed codes.

## 2024-02-21 ENCOUNTER — TRANSCRIBE ORDERS (OUTPATIENT)
Dept: OTHER | Age: 79
End: 2024-02-21

## 2024-02-21 DIAGNOSIS — M25.511 CHRONIC RIGHT SHOULDER PAIN: Primary | ICD-10-CM

## 2024-02-21 DIAGNOSIS — G89.29 CHRONIC RIGHT SHOULDER PAIN: Primary | ICD-10-CM

## 2024-03-28 ENCOUNTER — THERAPY VISIT (OUTPATIENT)
Dept: PHYSICAL THERAPY | Facility: CLINIC | Age: 79
End: 2024-03-28
Attending: FAMILY MEDICINE
Payer: COMMERCIAL

## 2024-03-28 ENCOUNTER — TRANSCRIBE ORDERS (OUTPATIENT)
Dept: OTHER | Age: 79
End: 2024-03-28

## 2024-03-28 DIAGNOSIS — G89.29 CHRONIC LEFT SHOULDER PAIN: Primary | ICD-10-CM

## 2024-03-28 DIAGNOSIS — M25.511 CHRONIC RIGHT SHOULDER PAIN: ICD-10-CM

## 2024-03-28 DIAGNOSIS — M25.512 CHRONIC LEFT SHOULDER PAIN: Primary | ICD-10-CM

## 2024-03-28 DIAGNOSIS — G89.29 CHRONIC RIGHT SHOULDER PAIN: ICD-10-CM

## 2024-03-28 PROCEDURE — 97140 MANUAL THERAPY 1/> REGIONS: CPT | Mod: GP | Performed by: PHYSICAL THERAPIST

## 2024-03-28 PROCEDURE — 97110 THERAPEUTIC EXERCISES: CPT | Mod: GP | Performed by: PHYSICAL THERAPIST

## 2024-03-28 PROCEDURE — 97161 PT EVAL LOW COMPLEX 20 MIN: CPT | Mod: GP | Performed by: PHYSICAL THERAPIST

## 2024-03-29 ENCOUNTER — TRANSCRIBE ORDERS (OUTPATIENT)
Dept: OTHER | Age: 79
End: 2024-03-29

## 2024-03-29 NOTE — PROGRESS NOTES
PHYSICAL THERAPY EVALUATION  Type of Visit: Evaluation    See electronic medical record for Abuse and Falls Screening details.    Subjective       Presenting condition or subjective complaint: shoulder pain/arthritis.  I had right hip surgery and had to use the walker more.  Then with house cleaning and helping my  my right shoulder got worse.  MRI shows bone on bone,  I have had 2 cortrisone shots.  I also had ORIF on left shoulder  Date of onset: 03/20/24 (MD appointment)    Relevant medical history: Arthritis; Asthma; High blood pressure; Kidney disease   Dates & types of surgery: right KELVIN 4/2023, left ORIF shoulder 2015    Prior diagnostic imaging/testing results: MRI; X-ray (arthritis, bone on bone)     Prior therapy history for the same diagnosis, illness or injury: No PT on left shoulder        Living Environment  Social support: With a significant other or spouse   Type of home: House   Stairs to enter the home:         Ramp:     Stairs inside the home:         Help at home: None  Equipment owned:       Employment: No    Hobbies/Interests: working out, family, [place], garden    Patient goals for therapy: reaching repetitive motion, lifting, cleaning without pain    Pain assessment: Location: anterior/superior shoulder /Rating: pl 7-9/10     Objective   SHOULDER EVALUATION  PAIN: Pain Level at Rest: 0/10  Pain Level with Use: 7/10  Pain Location: shoulder and anterior/superior  Pain Quality: Burning and Sharp  Pain Frequency: intermittent  Pain is Exacerbated By: reaching, cleaning, dusting, repetition, and sleeping   Pain is Relieved By: rest  POSTURE: Standing Posture: Rounded shoulders, Forward head, internal rotation of bilateral shoulder, downward rotation of bilateral scapular  ROM:   (Degrees) Left AROM Left PROM Right AROM  Right PROM   Shoulder Flexion 135  130 with pain     Shoulder Extension       Shoulder Abduction 120  103 with pain     Shoulder Adduction       Shoulder Internal  Rotation       Shoulder External Rotation       Shoulder Horizontal Abduction       Shoulder Horizontal Adduction       Shoulder Flexion ER neck  Neck      Shoulder Flexion IR Gluteal with tightness   L1 with tightness    Elbow Extension       Elbow Flexion       STRENGTH:   Pain: - none + mild ++ moderate +++ severe  Strength Scale: 0-5/5 Left Right   Shoulder Flexion 5 5-, + (mild)   Shoulder Extension     Shoulder Abduction 5 5-, + (mild)   Shoulder Adduction 5 5   Shoulder Internal Rotation 5 5   Shoulder External Rotation 5 5   Shoulder Horizontal Abduction     Shoulder Horizontal Adduction     Elbow Flexion 5 5   Elbow Extension 5 5   Mid Trap     Lower Trap     Rhomboid     Serratus Anterior  2     FLEXIBILITY:  tightness in UT, levator, pectoralis major/minor, posterior capsule  SPECIAL TESTS:  NA  PALPATION:  anterior superior shoulder, right UT, supraspinatus   JOINT MOBILITY:    Left Right   Glenohumeral anterior WNL WNL   Glenohumeral posterior Hypomobile Hypomobile   Glenohumeral inferior WNL Hypomobile   Acromioclavicular     Scapulothoracic     Sternoclavicular     Scapulohumeral rhythm Hypermobile Hypermobile     CERVICAL SCREEN:  CROM flexion WFL, extension minimal movement , rotation right 75%, left 50%     Assessment & Plan   CLINICAL IMPRESSIONS  Medical Diagnosis: 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  The following significant findings have been identified: Pain, Decreased ROM/flexibility, Decreased joint mobility, Decreased strength, Impaired muscle performance, and Impaired posture. These impairments interfere with their ability to perform self care tasks, recreational activities, household chores, household mobility, and community mobility as compared to previous level of function.      Clinical Decision Making (Complexity):  Clinical Presentation: Stable/Uncomplicated  Clinical Presentation Rationale: based on medical and personal factors listed in PT evaluation  Clinical Decision Making (Complexity): Low complexity    PLAN OF CARE  Treatment Interventions:  Interventions: Manual Therapy, Neuromuscular Re-education, Therapeutic Activity, Therapeutic Exercise    Long Term Goals     PT Goal 1  Goal Identifier: reaching over head  Goal Description: flexion 140, abduction 130, 0-1/10  Rationale: to maximize safety and independence with performance of ADLs and functional tasks;to maximize safety and independence within the home;to maximize safety and independence within the community;to maximize safety and independence with transportation;to maximize safety and independence with self cares  Goal Progress: reaching over head pl 7/10  Target Date: 06/06/24      Frequency of Treatment: 2x/ week then 1x/ week every other week  Duration of Treatment: 10 weeks    Recommended Referrals to Other Professionals:  none  Education Assessment:   Learner/Method: Patient;Demonstration;Pictures/Video;No Barriers to Learning    Risks and benefits of evaluation/treatment have been explained.   Patient/Family/caregiver agrees with Plan of Care.     Evaluation Time:     PT Eval, Low Complexity Minutes (99199): 14       Signing Clinician: Zeenat Dockery, PT      [facility]                                                                                   OUTPATIENT PHYSICAL THERAPY      PLAN OF TREATMENT FOR OUTPATIENT REHABILITATION   Patient's Last Name, First Name, Margy Foote YOB: 1945   Provider's Name   [facility]   Medical Record No.  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             Physician Signature               Date    X_____________________________________________________                  Referring Provider:  Shirley Hernandez PA-C    Initial Assessment  See Epic Evaluation- Start of Care Date: 03/28/24

## 2024-04-01 ENCOUNTER — THERAPY VISIT (OUTPATIENT)
Dept: PHYSICAL THERAPY | Facility: CLINIC | Age: 79
End: 2024-04-01
Payer: COMMERCIAL

## 2024-04-01 ENCOUNTER — TRANSFERRED RECORDS (OUTPATIENT)
Dept: HEALTH INFORMATION MANAGEMENT | Facility: CLINIC | Age: 79
End: 2024-04-01

## 2024-04-01 DIAGNOSIS — G89.29 CHRONIC LEFT SHOULDER PAIN: ICD-10-CM

## 2024-04-01 DIAGNOSIS — M25.511 CHRONIC RIGHT SHOULDER PAIN: Primary | ICD-10-CM

## 2024-04-01 DIAGNOSIS — G89.29 CHRONIC RIGHT SHOULDER PAIN: Primary | ICD-10-CM

## 2024-04-01 DIAGNOSIS — M25.512 CHRONIC LEFT SHOULDER PAIN: ICD-10-CM

## 2024-04-01 PROCEDURE — 97140 MANUAL THERAPY 1/> REGIONS: CPT | Mod: GP | Performed by: PHYSICAL THERAPIST

## 2024-04-01 PROCEDURE — 97112 NEUROMUSCULAR REEDUCATION: CPT | Mod: GP | Performed by: PHYSICAL THERAPIST

## 2024-04-01 PROCEDURE — 97110 THERAPEUTIC EXERCISES: CPT | Mod: GP | Performed by: PHYSICAL THERAPIST

## 2024-04-03 ENCOUNTER — THERAPY VISIT (OUTPATIENT)
Dept: PHYSICAL THERAPY | Facility: CLINIC | Age: 79
End: 2024-04-03
Payer: COMMERCIAL

## 2024-04-03 DIAGNOSIS — M25.512 CHRONIC LEFT SHOULDER PAIN: ICD-10-CM

## 2024-04-03 DIAGNOSIS — G89.29 CHRONIC RIGHT SHOULDER PAIN: Primary | ICD-10-CM

## 2024-04-03 DIAGNOSIS — G89.29 CHRONIC LEFT SHOULDER PAIN: ICD-10-CM

## 2024-04-03 DIAGNOSIS — M25.511 CHRONIC RIGHT SHOULDER PAIN: Primary | ICD-10-CM

## 2024-04-03 PROCEDURE — 97110 THERAPEUTIC EXERCISES: CPT | Mod: GP | Performed by: PHYSICAL THERAPIST

## 2024-04-03 PROCEDURE — 97140 MANUAL THERAPY 1/> REGIONS: CPT | Mod: GP | Performed by: PHYSICAL THERAPIST

## 2024-04-03 PROCEDURE — 97112 NEUROMUSCULAR REEDUCATION: CPT | Mod: GP | Performed by: PHYSICAL THERAPIST

## 2024-04-08 ENCOUNTER — THERAPY VISIT (OUTPATIENT)
Dept: PHYSICAL THERAPY | Facility: CLINIC | Age: 79
End: 2024-04-08
Payer: COMMERCIAL

## 2024-04-08 DIAGNOSIS — G89.29 CHRONIC LEFT SHOULDER PAIN: ICD-10-CM

## 2024-04-08 DIAGNOSIS — M25.511 CHRONIC RIGHT SHOULDER PAIN: Primary | ICD-10-CM

## 2024-04-08 DIAGNOSIS — G89.29 CHRONIC RIGHT SHOULDER PAIN: Primary | ICD-10-CM

## 2024-04-08 DIAGNOSIS — M25.512 CHRONIC LEFT SHOULDER PAIN: ICD-10-CM

## 2024-04-08 PROCEDURE — 97140 MANUAL THERAPY 1/> REGIONS: CPT | Mod: GP | Performed by: PHYSICAL THERAPIST

## 2024-04-08 PROCEDURE — 97110 THERAPEUTIC EXERCISES: CPT | Mod: GP | Performed by: PHYSICAL THERAPIST

## 2024-04-08 PROCEDURE — 97112 NEUROMUSCULAR REEDUCATION: CPT | Mod: GP | Performed by: PHYSICAL THERAPIST

## 2024-04-08 NOTE — PROGRESS NOTES
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   PT Tx Diagnosis bilateral shoulder pain right greater than left DJD   Precautions/Limitations left ORIF, limited motion 2015   Other pertinent information right KELVIN 4/2023   Quick Adds Certification   Progress Note/Certification   Start of Care Date 03/28/24   Onset of illness/injury or Date of Surgery 03/20/24  (MD appointment)   Therapy Frequency 2x/ week then 1x/ week every other week   Predicted Duration 10 weeks   Certification date from 03/28/24   Certification date to 06/06/24   Progress Note Due Date 04/08/24   Progress Note Completed Date 03/28/24   PT Goal 1   Goal Identifier reaching over head   Goal Description flexion 140, abduction 130, 0-1/10   Rationale to maximize safety and independence with performance of ADLs and functional tasks;to maximize safety and independence within the home;to maximize safety and independence within the community;to maximize safety and independence with transportation;to maximize safety and independence with self cares   Goal Progress reaching over head pl 4/10   Target Date 06/06/24   Subjective Report   Subjective Report I have been a little sore post wednesday, but felt better on the weekend, will try to move out appointments.  in my right shoulder, I can sleep at night.  during the day, My right is getting a little stronger, the left arm now with using more has been a little more sore and achy.   Objective Measures   Objective Measures Objective Measure 1;Objective Measure 2   Objective Measure 1   Objective Measure shoulder ROM   Details right flexion 140 with tightness, abduction 102 with pain, opposite shoulder tightness, behind the head tightness , behind the back L1, left flexion 135,with soreness abduction 110, opposite shoulder 80%, behind the head  to neck with tightness behind the back to gluteal   Objective Measure 2   Objective Measure strength   Details generally 5/5   Treatment Interventions (PT)   Interventions Therapeutic Procedure/Exercise;Neuromuscular Re-education;Manual Therapy   Therapeutic Procedure/Exercise   Therapeutic Procedures: strength, endurance, ROM, flexibility minutes (75991) 14   Therapeutic Procedures Ther Proc 2;Ther Proc 3;Ther Proc 4;Ther Proc 5   Ther Proc 1 PTRX   Ther Proc 1 - Details updated and education   Ther Proc 2 UBE   Ther Proc 2 - Details 4 min backwards   Ther Proc 3 pulleys   Ther Proc 3 - Details flexion, abduction  both gently for ROM   Ther Proc 4 shoulder punch   Ther Proc 4 - Details supine for shoulder flexion not to raise shoulder complex off the ground   Ther Proc 5 PROM   Ther Proc 5 - Details bilateral shoulders rotation/flexion   PTRx Ther Proc 1 Shoulder AROM IR/ER Supine at 30-40 Degrees Abduction   PTRx Ther Proc 1 - Details 15 reps 1-2x/ day both shoulders increase in full ROM   PTRx Ther Proc 2 Shoulder External Rotation Sidelying   PTRx Ther Proc 2 - Details side lying bilateral 15 x ag 1-2/ day  working on spinning the humerus   PTRx Ther Proc 3 Pec Stretch Doorway   PTRx Ther Proc 3 - Details HEP2x 30 sec watching head and arm position   Skilled Intervention progression for ROM and RC, also for left shoulder   Patient Response/Progress tightness remains   Neuromuscular Re-education   Neuromuscular re-ed of mvmt, balance, coord, kinesthetic sense, posture, proprioception minutes (60844) 15   Neuromuscular Re-education Neuro Re-ed 2;Neuro Re-ed 3;Neuro Re-ed 4   Neuro Re-ed 1 pull down   Neuro Re-ed 1 - Details yellow x 15 with instruction, scapular control   Neuro Re-ed 2 modifciation for  activity   Neuro Re-ed 2 - Details computer, gardening, not reaching over head taking breaks   Neuro Re-ed 3 shoulder extension   Neuro Re-ed 3 - Details HEP standing 15 x ag   PTRx Neuro Re-ed 1 Scapular  Retraction/Depression   PTRx Neuro Re-ed 1 - Details 3-5  times 2-3 sec 4-5x/ day    PTRx Neuro Re-ed 2 Shoulder Theraband Rows   PTRx Neuro Re-ed 2 - Details 15 x red 1x/ day red.  still needs cueing for scapular control   Skilled Intervention continue to work on scapular control, left greater than right   Patient Response/Progress increase awareness   Neuro Re-ed 4 scapular positionn   Neuro Re-ed 4 - Details left downward rotated working on UT activity for better position   Manual Therapy   Manual Therapy: Mobilization, MFR, MLD, friction massage minutes (50690) 13   Manual Therapy Manual Therapy 2;Manual Therapy 3   Manual Therapy 1 Inferior, A-P's   Manual Therapy 1 - Details gr II-III in supine right more than left   Manual Therapy 2 STM   Manual Therapy 2 - Details UT, levator, biceps right greater than  left, left pectoralis major, minor   Manual Therapy 3 posterior glide with movement   Manual Therapy 3 - Details 15 x supine   Skilled Intervention moderate hands on pressure   Patient Response/Progress increase inferior glide, A-P's still tightness   Education   Learner/Method Patient;Demonstration;Pictures/Video;No Barriers to Learning   Plan   Home program PTRX   Updates to plan of care ROM, manual scpaular control   Plan for next session RC, manual   Comments   Comments left shoulder also limited   Total Session Time   Timed Code Treatment Minutes 42   Total Treatment Time (sum of timed and untimed services) 42       PLAN  Patient has reported a slight improvement with right shoulder movement and decrease in pain.  Patient does report left shoulder pain and plans to see MD this week for it.  Right shoulder tightness with GH movement especially for abduction.  Strength bilateral WFL.  Left shoulder poor scapular position with downward rotation and decrease in AROM especially behind the back.  Would like to continue to follow patient 1x/ week for 6 more weeks for ROM and functional activity.  Thank you for  this referral.     Beginning/End Dates of Progress Note Reporting Period:  03/28/24 to 04/08/2024    Referring Provider:  Zeenat Ceja

## 2024-04-18 ENCOUNTER — THERAPY VISIT (OUTPATIENT)
Dept: PHYSICAL THERAPY | Facility: CLINIC | Age: 79
End: 2024-04-18
Payer: COMMERCIAL

## 2024-04-18 DIAGNOSIS — G89.29 CHRONIC LEFT SHOULDER PAIN: ICD-10-CM

## 2024-04-18 DIAGNOSIS — G89.29 CHRONIC RIGHT SHOULDER PAIN: Primary | ICD-10-CM

## 2024-04-18 DIAGNOSIS — M25.511 CHRONIC RIGHT SHOULDER PAIN: Primary | ICD-10-CM

## 2024-04-18 DIAGNOSIS — M25.512 CHRONIC LEFT SHOULDER PAIN: ICD-10-CM

## 2024-04-18 PROCEDURE — 97112 NEUROMUSCULAR REEDUCATION: CPT | Mod: GP | Performed by: PHYSICAL THERAPIST

## 2024-04-18 PROCEDURE — 97110 THERAPEUTIC EXERCISES: CPT | Mod: GP | Performed by: PHYSICAL THERAPIST

## 2024-04-18 PROCEDURE — 97140 MANUAL THERAPY 1/> REGIONS: CPT | Mod: GP | Performed by: PHYSICAL THERAPIST

## 2024-04-22 ENCOUNTER — THERAPY VISIT (OUTPATIENT)
Dept: PHYSICAL THERAPY | Facility: CLINIC | Age: 79
End: 2024-04-22
Payer: COMMERCIAL

## 2024-04-22 DIAGNOSIS — G89.29 CHRONIC RIGHT SHOULDER PAIN: Primary | ICD-10-CM

## 2024-04-22 DIAGNOSIS — G89.29 CHRONIC LEFT SHOULDER PAIN: ICD-10-CM

## 2024-04-22 DIAGNOSIS — M25.512 CHRONIC LEFT SHOULDER PAIN: ICD-10-CM

## 2024-04-22 DIAGNOSIS — M25.511 CHRONIC RIGHT SHOULDER PAIN: Primary | ICD-10-CM

## 2024-04-22 PROCEDURE — 97140 MANUAL THERAPY 1/> REGIONS: CPT | Mod: GP | Performed by: PHYSICAL THERAPIST

## 2024-04-22 PROCEDURE — 97110 THERAPEUTIC EXERCISES: CPT | Mod: GP | Performed by: PHYSICAL THERAPIST

## 2024-04-24 ENCOUNTER — THERAPY VISIT (OUTPATIENT)
Dept: PHYSICAL THERAPY | Facility: CLINIC | Age: 79
End: 2024-04-24
Payer: COMMERCIAL

## 2024-04-24 DIAGNOSIS — G89.29 CHRONIC RIGHT SHOULDER PAIN: Primary | ICD-10-CM

## 2024-04-24 DIAGNOSIS — G89.29 CHRONIC LEFT SHOULDER PAIN: ICD-10-CM

## 2024-04-24 DIAGNOSIS — M25.512 CHRONIC LEFT SHOULDER PAIN: ICD-10-CM

## 2024-04-24 DIAGNOSIS — M25.511 CHRONIC RIGHT SHOULDER PAIN: Primary | ICD-10-CM

## 2024-04-24 PROCEDURE — 97140 MANUAL THERAPY 1/> REGIONS: CPT | Mod: GP | Performed by: PHYSICAL THERAPIST

## 2024-04-24 PROCEDURE — 97110 THERAPEUTIC EXERCISES: CPT | Mod: GP | Performed by: PHYSICAL THERAPIST

## 2024-04-25 NOTE — PROGRESS NOTES
04/24/24 0500   Appointment Info   Signing clinician's name / credentials edison [email or web address] PT   Total/Authorized Visits 8 +6 Marcial   Visits Used 7   Medical Diagnosis bilateral shoulder pain right greater than left DJD   PT Tx Diagnosis bilateral shoulder pain right greater than left DJD   Precautions/Limitations left ORIF, limited motion 2015   Other pertinent information right KELVIN 4/2023   Quick Adds Certification   Progress Note/Certification   Start of Care Date 03/28/24   Onset of illness/injury or Date of Surgery 03/20/24  (MD appointment)   Therapy Frequency 2x/ week then 1x/ week every other week   Predicted Duration 10 weeks   Certification date from 03/28/24   Certification date to 06/06/24   Progress Note Due Date 04/24/24   Progress Note Completed Date 04/08/24   PT Goal 1   Goal Identifier reaching over head   Goal Description flexion 140, abduction 130, 0-1/10   Rationale to maximize safety and independence with performance of ADLs and functional tasks;to maximize safety and independence within the home;to maximize safety and independence within the community;to maximize safety and independence with transportation;to maximize safety and independence with self cares   Goal Progress reaching over head pl 2-3/10, abduction 115   Target Date 06/06/24   Subjective Report   Subjective Report right arm much  improved, but left arm can be a little more painful  increase in reps of exercises.  I shoulder 75% of my normal, left 50% of my normal,   Objective Measures   Objective Measures Objective Measure 1;Objective Measure 2   Objective Measure 1   Objective Measure shoulder ROM   Details right flexion 140 with tightness, abduction 115 with pain, opposite shoulder tightness, behind the head tightness , behind the back L1, left flexion 135,with soreness abduction 105, opposite shoulder 80%, behind the head to neck with tightness behind the back to gluteal, post stretching right abduction 125, left 115    Objective Measure 2   Objective Measure strength   Details generally 5/5   Treatment Interventions (PT)   Interventions Therapeutic Procedure/Exercise;Neuromuscular Re-education;Manual Therapy   Therapeutic Procedure/Exercise   Therapeutic Procedures: strength, endurance, ROM, flexibility minutes (85698) 24   Therapeutic Procedures Ther Proc 2;Ther Proc 3;Ther Proc 4;Ther Proc 5;Ther Proc 6;Ther Proc 7   Ther Proc 1 PTRX   Ther Proc 1 - Details updated and education   Ther Proc 2 UBE   Ther Proc 2 - Details 5 min backwards/forwards   Ther Proc 3 pulleys   Ther Proc 3 - Details flexion, abduction  both gently for ROM   Ther Proc 4 shoulder flexion   Ther Proc 4 - Details REV 15 x every other day   Ther Proc 5 PROM   Ther Proc 5 - Details bilateral shoulders rotation/flexion   Ther Proc 6 shouder scaption   Ther Proc 6 - Details REV use of mirror with shoulder and scapular position x 15   PTRx Ther Proc 1 Shoulder AROM IR/ER Supine at 30-40 Degrees Abduction   PTRx Ther Proc 1 - Details PRIM15 reps 1-2x/ day both shoulders increase in full ROM   PTRx Ther Proc 2 Shoulder External Rotation Sidelying   PTRx Ther Proc 2 - Details HEP progressing with light weight side lying bilateral 15 x ag 1-2/ day  working on spinning the humerus   PTRx Ther Proc 3 Pec Stretch Doorway   PTRx Ther Proc 3 - Details rev 2x 30 sec watching head and arm position   Skilled Intervention increase in movement with AROM in supine, increase in scaption, left arm beind the back still limited   Patient Response/Progress end  of session decrease in pain and increase in ROM   Ther Proc 7 AAROM   Ther Proc 7 - Details use of scapular movement to assist with abduction increase in right movement   Neuromuscular Re-education   Neuromuscular Re-education Neuro Re-ed 2;Neuro Re-ed 3;Neuro Re-ed 4;Neuro Re-ed 5;Neuro Re-ed 6   Neuro Re-ed 1 pull down   Neuro Re-ed 1 - Details HPE yellow x 15 with instruction, scapular control   Neuro Re-ed 2 modifciation  for  activity   Neuro Re-ed 2 - Details computer, gardening, not reaching over head taking breaks   Neuro Re-ed 3 shoulder extension   Neuro Re-ed 3 - Details rev progression with weights  standing 15 x ag   Neuro Re-ed 4 scapular positionn   Neuro Re-ed 4 - Details left downward rotated working on UT activity for better position   Neuro Re-ed 5 push up on the wall no plus   Neuro Re-ed 5 - Details mini x 10 watching pain   Neuro Re-ed 6 discussed sleeping and arm movement,   Neuro Re-ed 6 - Details generally doesn't move with sleeping, use of medication if allowed, ROM and hear/ice   PTRx Neuro Re-ed 1 Scapular Retraction/Depression   PTRx Neuro Re-ed 1 - Details reviewed where  can put on hand and pressure for guidance 3-5  times 2-3 sec 4-5x/ day   PTRx Neuro Re-ed 2 Shoulder Theraband Rows   PTRx Neuro Re-ed 2 - Details HEP 15 x red 1x/ day red.  still needs cueing for scapular control   Manual Therapy   Manual Therapy: Mobilization, MFR, MLD, friction massage minutes (07188) 14   Manual Therapy Manual Therapy 2;Manual Therapy 3   Manual Therapy 1 Inferior, A-P's   Manual Therapy 1 - Details gr III in supine right more than left   Manual Therapy 2 STM   Manual Therapy 2 - Details UT, levator, biceps right greater than  left, left pectoralis major, minor   Manual Therapy 3 posterior glide with movement   Manual Therapy 3 - Details 15 x supine   Skilled Intervention moderate hands on pressure   Patient Response/Progress decrease in tightness   Education   Learner/Method Patient;Demonstration;Pictures/Video;No Barriers to Learning   Plan   Home program PTRX   Updates to plan of care PROM. AAROM, GH movement   Plan for next session functional activity to see 1x [place]   Comments   Comments moving to [facility] for summer mid May   Total Session Time   Timed Code Treatment Minutes 38   Total Treatment Time (sum of timed and untimed services) 38       PLAN  Patient to be seen 1x more and then  transfer to PT [facility] for the summer.  Patient has shown increase in ROM, strength, and functional activity more right shoulder,  Left shoulder still sore and limited with abduction and behind the back.  Thank you for this referral.   PN will become discharge note.    Beginning/End Dates of Progress Note Reporting Period:  04/08/24 to 04/24/2024    Referring Provider:  Zeenat Ceja

## 2024-05-06 ENCOUNTER — THERAPY VISIT (OUTPATIENT)
Dept: PHYSICAL THERAPY | Facility: CLINIC | Age: 79
End: 2024-05-06
Payer: COMMERCIAL

## 2024-05-06 DIAGNOSIS — G89.29 CHRONIC RIGHT SHOULDER PAIN: Primary | ICD-10-CM

## 2024-05-06 DIAGNOSIS — M25.511 CHRONIC RIGHT SHOULDER PAIN: Primary | ICD-10-CM

## 2024-05-06 DIAGNOSIS — M25.512 CHRONIC LEFT SHOULDER PAIN: ICD-10-CM

## 2024-05-06 DIAGNOSIS — G89.29 CHRONIC LEFT SHOULDER PAIN: ICD-10-CM

## 2024-05-06 PROCEDURE — 97110 THERAPEUTIC EXERCISES: CPT | Mod: GP

## 2024-05-06 PROCEDURE — 97112 NEUROMUSCULAR REEDUCATION: CPT | Mod: GP

## 2024-12-03 ENCOUNTER — TRANSCRIBE ORDERS (OUTPATIENT)
Dept: OTHER | Age: 79
End: 2024-12-03

## 2024-12-03 DIAGNOSIS — M25.512 ACUTE PAIN OF LEFT SHOULDER: Primary | ICD-10-CM

## 2024-12-04 ENCOUNTER — THERAPY VISIT (OUTPATIENT)
Dept: PHYSICAL THERAPY | Facility: CLINIC | Age: 79
End: 2024-12-04
Payer: COMMERCIAL

## 2024-12-04 DIAGNOSIS — M25.512 ACUTE PAIN OF LEFT SHOULDER: Primary | ICD-10-CM

## 2024-12-04 PROCEDURE — 97110 THERAPEUTIC EXERCISES: CPT | Mod: GP | Performed by: PHYSICAL THERAPIST

## 2024-12-04 PROCEDURE — 97161 PT EVAL LOW COMPLEX 20 MIN: CPT | Mod: GP | Performed by: PHYSICAL THERAPIST

## 2024-12-04 PROCEDURE — 97140 MANUAL THERAPY 1/> REGIONS: CPT | Mod: GP | Performed by: PHYSICAL THERAPIST

## 2024-12-04 ASSESSMENT — ACTIVITIES OF DAILY LIVING (ADL)
AT_ITS_WORST?: 8
PLEASE_INDICATE_YOR_PRIMARY_REASON_FOR_REFERRAL_TO_THERAPY:: SHOULDER
TOUCHING_THE_BACK_OF_YOUR_NECK: 6
PUTTING_ON_AN_UNDERSHIRT_OR_A_PULLOVER_SWEATER: 6
PUSHING_WITH_THE_INVOLVED_ARM: 6
CARRYING_A_HEAVY_OBJECT_OF_10_POUNDS: 8
WASHING_YOUR_HAIR?: 2
WASHING_YOUR_BACK: 10
PUTTING_ON_A_SHIRT_THAT_BUTTONS_DOWN_THE_FRONT: 0
REMOVING_SOMETHING_FROM_YOUR_BACK_POCKET: 8
PLACING_AN_OBJECT_ON_A_HIGH_SHELF: 3
REACHING_FOR_SOMETHING_ON_A_HIGH_SHELF: 6
WHEN_LYING_ON_THE_INVOLVED_SIDE: 9
PUTTING_ON_YOUR_PANTS: 0

## 2024-12-05 NOTE — PROGRESS NOTES
PHYSICAL THERAPY EVALUATION  Type of Visit: Evaluation              Subjective I had a great summer, but by the end of the summer I started to have left shoulder pain.  I had pain with raising the arm up.  I had a cortisone shot end of October which helped a for a month, but the pain had returned         Presenting condition or subjective complaint: (Patient-Rptd) pain in shoulder plus mobility  Date of onset: 12/03/24 (MD appointment)    Relevant medical history:   left shoulder fracture, right shoulder pain   Dates & types of surgery: (Patient-Rptd) hip replacement 2022  shoulder repair 2014    Prior diagnostic imaging/testing results: (Patient-Rptd) MRI; X-ray     Prior therapy history for the same diagnosis, illness or injury: (Patient-Rptd) Yes (Patient-Rptd) 2023      Living Environment  Social support: (Patient-Rptd) With a significant other or spouse   Type of home: (Patient-Rptd) House   Stairs to enter the home:         Ramp: (Patient-Rptd) No   Stairs inside the home: (Patient-Rptd) Yes (Patient-Rptd) 11 Is there a railing: (Patient-Rptd) Yes     Help at home: (Patient-Rptd) None  Equipment owned:   [employer]     Employment:      Hobbies/Interests:  gardening     Patient goals for therapy: (Patient-Rptd) have full range of motion without pain    Pain assessment: Location: left shoulder /Rating: pl6-8/10     Objective   SHOULDER EVALUATION  PAIN: Pain Level at Rest: 0/10  Pain Level with Use: 7/10  Pain Location: shoulder  Pain Quality: Dull and Sharp  Pain Frequency: intermittent  Pain is Exacerbated By: reaching over head out to the side and behind the back     POSTURE: Standing Posture: downward rotation of left scapular   ROM:  left shoulder flexion 80, abduction 48, behind the head to head, opposite shoulder 80%, behind the back side of head, ER 20, IR 40, right shoulder flexion 150, abduction 135, behind head to cervical , opposite shoulder WFL, behindback to T12    STRENGTH:  ER 5-/5 with pain,  flexion, abduction strong but painfuln   FLEXIBILITY:  pectoralis major, minor, UT, levator   SPECIAL TESTS:  poor scapular control   PALPATION:  anterior/superior shoulder  JOINT MOBILITY:  hypo mobility with inferior, A-P glide, increase in scapular movement abduction, decrease in adduction   CERVICAL SCREEN:  CROM flexion WFL, extension minimal ,  rotation moderate, SB moderate.    Assessment & Plan   CLINICAL IMPRESSIONS  Medical Diagnosis: left shoulder pain    Treatment Diagnosis: left shoulder pain, possible adhesive capsulitis   Impression/Assessment: Patient is a 79 year old female with left shoulder pain complaints.  The following significant findings have been identified: Pain, Decreased ROM/flexibility, Decreased joint mobility, Decreased strength, Impaired muscle performance, Decreased activity tolerance, and Impaired posture. These impairments interfere with their ability to perform self care tasks, recreational activities, household chores, and driving  as compared to previous level of function.     Clinical Decision Making (Complexity):  Clinical Presentation: Stable/Uncomplicated  Clinical Presentation Rationale: based on medical and personal factors listed in PT evaluation  Clinical Decision Making (Complexity): Low complexity    PLAN OF CARE  Treatment Interventions:  Interventions: Manual Therapy, Neuromuscular Re-education, Therapeutic Activity, Therapeutic Exercise    Long Term Goals     PT Goal 1  Goal Identifier: raising arm above head  Goal Description: left shoulder flexion 140, abduction 135  Rationale: to maximize safety and independence with performance of ADLs and functional tasks;to maximize safety and independence within the home;to maximize safety and independence with self cares  Goal Progress: left shoulder flexion 80, abduction 48  Target Date: 01/29/25      Frequency of Treatment: 1x/ week every other week  Duration of Treatment: 9 weeks    Recommended Referrals to Other  Professionals:  none  Education Assessment:   Learner/Method: Patient;Demonstration;Pictures/Video;No Barriers to Learning    Risks and benefits of evaluation/treatment have been explained.   Patient/Family/caregiver agrees with Plan of Care.     Evaluation Time:     PT Eval, Low Complexity Minutes (26190): 15       Signing Clinician: JAE Garcia [facility]                                                                                   OUTPATIENT PHYSICAL THERAPY      PLAN OF TREATMENT FOR OUTPATIENT REHABILITATION   Patient's Last Name, First Name, Margy Foote YOB: 1945   Provider's Name   [facility]   Medical Record No.  [id number]     Onset Date: 12/03/24 (MD appointment)  Start of Care Date: 12/04/24     Medical Diagnosis:  left shoulder pain      PT Treatment Diagnosis:  left shoulder pain, possible adhesive capsulitis Plan of Treatment  Frequency/Duration: 1x/ week every other week/ 9 weeks    Certification date from 12/04/24 to 01/29/25         See note for plan of treatment details and functional goals     Zeenat Dockery, PT                         I CERTIFY THE NEED FOR THESE SERVICES FURNISHED UNDER        THIS PLAN OF TREATMENT AND WHILE UNDER MY CARE .             Physician Signature               Date    X_____________________________________________________                  Referring Provider:  Esteban Melgar    Initial Assessment  See Epic Evaluation- Start of Care Date: 12/04/24

## 2024-12-10 ENCOUNTER — THERAPY VISIT (OUTPATIENT)
Dept: PHYSICAL THERAPY | Facility: CLINIC | Age: 79
End: 2024-12-10
Payer: COMMERCIAL

## 2024-12-10 DIAGNOSIS — M25.512 ACUTE PAIN OF LEFT SHOULDER: Primary | ICD-10-CM

## 2024-12-10 PROCEDURE — 97110 THERAPEUTIC EXERCISES: CPT | Mod: GP | Performed by: PHYSICAL THERAPIST

## 2024-12-10 PROCEDURE — 97140 MANUAL THERAPY 1/> REGIONS: CPT | Mod: GP | Performed by: PHYSICAL THERAPIST

## 2024-12-18 ENCOUNTER — THERAPY VISIT (OUTPATIENT)
Dept: PHYSICAL THERAPY | Facility: CLINIC | Age: 79
End: 2024-12-18
Payer: COMMERCIAL

## 2024-12-18 DIAGNOSIS — M25.512 ACUTE PAIN OF LEFT SHOULDER: Primary | ICD-10-CM

## 2024-12-18 PROCEDURE — 97140 MANUAL THERAPY 1/> REGIONS: CPT | Mod: GP | Performed by: PHYSICAL THERAPIST

## 2024-12-18 PROCEDURE — 97110 THERAPEUTIC EXERCISES: CPT | Mod: GP | Performed by: PHYSICAL THERAPIST

## 2024-12-19 PROBLEM — M25.511 CHRONIC RIGHT SHOULDER PAIN: Status: RESOLVED | Noted: 2024-03-28 | Resolved: 2024-12-19

## 2024-12-19 PROBLEM — G89.29 CHRONIC RIGHT SHOULDER PAIN: Status: RESOLVED | Noted: 2024-03-28 | Resolved: 2024-12-19

## 2024-12-24 ENCOUNTER — THERAPY VISIT (OUTPATIENT)
Dept: PHYSICAL THERAPY | Facility: CLINIC | Age: 79
End: 2024-12-24
Payer: COMMERCIAL

## 2024-12-24 DIAGNOSIS — M25.512 ACUTE PAIN OF LEFT SHOULDER: Primary | ICD-10-CM

## 2024-12-24 PROCEDURE — 97140 MANUAL THERAPY 1/> REGIONS: CPT | Mod: GP | Performed by: PHYSICAL THERAPIST

## 2024-12-24 PROCEDURE — 97110 THERAPEUTIC EXERCISES: CPT | Mod: GP | Performed by: PHYSICAL THERAPIST

## 2024-12-31 ENCOUNTER — THERAPY VISIT (OUTPATIENT)
Dept: PHYSICAL THERAPY | Facility: CLINIC | Age: 79
End: 2024-12-31
Payer: COMMERCIAL

## 2024-12-31 DIAGNOSIS — M25.512 ACUTE PAIN OF LEFT SHOULDER: Primary | ICD-10-CM

## 2024-12-31 PROCEDURE — 97110 THERAPEUTIC EXERCISES: CPT | Mod: GP | Performed by: PHYSICAL THERAPIST

## 2024-12-31 PROCEDURE — 97140 MANUAL THERAPY 1/> REGIONS: CPT | Mod: GP | Performed by: PHYSICAL THERAPIST

## 2024-12-31 NOTE — PROGRESS NOTES
12/31/24 0500   Appointment Info   Signing clinician's name / credentials edison escobar   Total/Authorized Visits 6   Visits Used 5   Medical Diagnosis left shoulder pain   PT Tx Diagnosis left shoulder pain, possible adhesive capsulitis   Precautions/Limitations left shoulder previous fracture, right shoulder pain   Progress Note/Certification   Start of Care Date 12/04/24   Onset of illness/injury or Date of Surgery 12/03/24  (MD appointment)   Therapy Frequency 1x/ week every other week   Predicted Duration 9 weeks   Certification date from 12/04/24   Certification date to 01/29/25   Progress Note Due Date 01/07/25  (MD visit)   Progress Note Completed Date 12/04/24   PT Goal 1   Goal Identifier raising arm above head   Goal Description left shoulder flexion 140, abduction 135   Rationale to maximize safety and independence with performance of ADLs and functional tasks;to maximize safety and independence within the home;to maximize safety and independence with self cares   Goal Progress left shoulder flexion 109, abduction 118   Target Date 01/29/25   Subjective Report   Subjective Report I have some good days, but still have some very bad days and not sure what causes it.  Last night the pain was a 6/10, today hardly any pain.  I can not figure out what happens to increase in pain.  Motion is improving   Objective Measures   Objective Measures Objective Measure 1;Objective Measure 2;Objective Measure 3   Objective Measure 1   Objective Measure shoulder ROM   Details left shoulder flexion 109, abduction 118, behind the head to upper cervical , opposite shoulder 80%, behind the back  of head, ER 70, IR 50, ext + IR thumb to L mid  buttock, right shoulder flexion 150, abduction 135, behind head to cervical , opposite shoulder WFL, behindback to T9.  Supine AROM L shoulder flexion 120.   Objective Measure 2   Objective Measure strength   Details er 5-/5 left with pain, poor scapular position (scapular  winging on L, downward rotation)   Objective Measure 3   Objective Measure posture. extreme tightness in posterior capsule   Details left shoulder scapular downward rotation, better scpaular position   Treatment Interventions (PT)   Interventions Therapeutic Procedure/Exercise;Neuromuscular Re-education;Manual Therapy   Therapeutic Procedure/Exercise   Therapeutic Procedures: strength, endurance, ROM, flexibility minutes (91552) 25   Therapeutic Procedures Ther Proc 2;Ther Proc 3;Ther Proc 4;Ther Proc 5;Ther Proc 6;Ther Proc 7;Ther Proc 8;Ther Proc 9;Ther Proc 10   Ther Proc 1 AAROM/PROM with gentle end range stretching   Ther Proc 1 - Details Supine and R sidelying for L shoulder flex, ABD, scaption, ER, IR   Ther Proc 2 supine AROM flexion L shoulder   Ther Proc 2 - Details clinic only x 5   Ther Proc 3 shoulder IR   Ther Proc 3 - Details supine with gently movement working on GH movement to be done after wand exercises   Ther Proc 4 pendulum   Ther Proc 4 - Details all direction using body for movement and to warm up muscles   Ther Proc 5 shoulder flexion holding onto counter   Ther Proc 5 - Details stepping away for stretch x 5   Ther Proc 6 UBE   Ther Proc 6 - Details 3 backwards   Ther Proc 7 posterior capsule   Ther Proc 7 - Details sidelying 2 x30 sec gently with instruction   PTRx Ther Proc 1 shoulder ER sidelying   PTRx Ther Proc 1 - Details 15 x ag   PTRx Ther Proc 2 Pulley Shoulder Flexion,  scaption   PTRx Ther Proc 2 - Details flexion 3 min 1-2x/ day push into tightness not pain slightly into scaption x 1 min   PTRx Ther Proc 3 Scapular Retraction/Depression   PTRx Ther Proc 3 - Details 2 sets 5 sec hold 2-3 sec 4-5x/ day  feels good   PTRx Ther Proc 4 Shoulder Shrugs   PTRx Ther Proc 4 - Details 3-5 reps 2-3xday    PTRx Ther Proc 5 Serratus Punch with Dumbbells   PTRx Ther Proc 5 - Details 2x10 supine AG work for serratus and upward rotation of scapula.   Skilled Intervention progression with RONA  stengthening and ROM   Patient Response/Progress progression with ROM,   Ther Proc 8 4 corner stretch for ER   Ther Proc 8 - Details 2 x 15 sec   Ther Proc 9 pec stretch   Ther Proc 9 - Details arms down for streching   Ther Proc 10 rowing   Ther Proc 10 - Details 15 x red   Manual Therapy   Manual Therapy: Mobilization, MFR, MLD, friction massage minutes (85551) 15   Manual Therapy Manual Therapy 2;Manual Therapy 3;Manual Therapy 4   Manual Therapy 1 inferior, A-P glide, also oscillation/distraction with PROM   Manual Therapy 1 - Details left shoulder GH in supine and R sidelying gr III   Manual Therapy 2 STM   Manual Therapy 2 - Details scapular muscles   Manual Therapy 3 scap mobs in R sidelying for L shoulder   Manual Therapy 3 - Details x 3 min   Manual Therapy 4 mobs with movement   Manual Therapy 4 - Details flexion, scapation   Skilled Intervention manual, increase in pressure,   Patient Response/Progress ROM improving   Education   Learner/Method Patient;Demonstration;Pictures/Video;No Barriers to Learning   Plan   Home program PTRX   Updates to plan of care pec stretch, manual, posterior capsule stretch   Plan for next session manual, ROM, strength, scap   Total Session Time   Timed Code Treatment Minutes 40   Total Treatment Time (sum of timed and untimed services) 40       PLAN  Patient has shown an improvement with ROM, flexion from 80 to 109, abduction 45 to 119, ER 30 to 70, IR 40 to 50.  still have days of pain.  Extreme tightness of GH joint.  Patient has shown an improvement with scapular position but still needs strengthening and manual work to help with flexion, abduction.  Patient would benefit from further therapy 1x/ week for 8 weeks.    Beginning/End Dates of Progress Note Reporting Period:  12/04/24 to 12/31/2024    Referring Provider:  Esteban Melgar

## 2025-01-06 ENCOUNTER — THERAPY VISIT (OUTPATIENT)
Dept: PHYSICAL THERAPY | Facility: CLINIC | Age: 80
End: 2025-01-06
Payer: COMMERCIAL

## 2025-01-06 DIAGNOSIS — M25.512 ACUTE PAIN OF LEFT SHOULDER: Primary | ICD-10-CM

## 2025-01-06 PROCEDURE — 97110 THERAPEUTIC EXERCISES: CPT | Mod: GP | Performed by: PHYSICAL THERAPIST

## 2025-01-06 PROCEDURE — 97140 MANUAL THERAPY 1/> REGIONS: CPT | Mod: GP | Performed by: PHYSICAL THERAPIST

## 2025-01-08 ENCOUNTER — TRANSCRIBE ORDERS (OUTPATIENT)
Dept: OTHER | Age: 80
End: 2025-01-08

## 2025-01-08 DIAGNOSIS — G89.29 CHRONIC PAIN OF LEFT KNEE: ICD-10-CM

## 2025-01-08 DIAGNOSIS — G89.29 CHRONIC PAIN OF BOTH SHOULDERS: ICD-10-CM

## 2025-01-08 DIAGNOSIS — M12.812 ROTATOR CUFF ARTHROPATHY OF BOTH SHOULDERS: ICD-10-CM

## 2025-01-08 DIAGNOSIS — M25.511 CHRONIC PAIN OF BOTH SHOULDERS: ICD-10-CM

## 2025-01-08 DIAGNOSIS — M12.811 ROTATOR CUFF ARTHROPATHY OF BOTH SHOULDERS: ICD-10-CM

## 2025-01-08 DIAGNOSIS — M19.011 LOCALIZED OSTEOARTHRITIS OF SHOULDER REGIONS, BILATERAL: Primary | ICD-10-CM

## 2025-01-08 DIAGNOSIS — M17.12 PRIMARY OSTEOARTHRITIS OF LEFT KNEE: ICD-10-CM

## 2025-01-08 DIAGNOSIS — M19.012 LOCALIZED OSTEOARTHRITIS OF SHOULDER REGIONS, BILATERAL: Primary | ICD-10-CM

## 2025-01-08 DIAGNOSIS — M25.562 CHRONIC PAIN OF LEFT KNEE: ICD-10-CM

## 2025-01-08 DIAGNOSIS — M25.512 CHRONIC PAIN OF BOTH SHOULDERS: ICD-10-CM

## 2025-01-15 ENCOUNTER — THERAPY VISIT (OUTPATIENT)
Dept: PHYSICAL THERAPY | Facility: CLINIC | Age: 80
End: 2025-01-15
Payer: COMMERCIAL

## 2025-01-15 DIAGNOSIS — M25.512 ACUTE PAIN OF LEFT SHOULDER: Primary | ICD-10-CM

## 2025-01-15 PROCEDURE — 97140 MANUAL THERAPY 1/> REGIONS: CPT | Mod: GP | Performed by: PHYSICAL THERAPIST

## 2025-01-15 PROCEDURE — 97110 THERAPEUTIC EXERCISES: CPT | Mod: GP | Performed by: PHYSICAL THERAPIST

## 2025-01-22 ENCOUNTER — THERAPY VISIT (OUTPATIENT)
Dept: PHYSICAL THERAPY | Facility: CLINIC | Age: 80
End: 2025-01-22
Payer: COMMERCIAL

## 2025-01-22 DIAGNOSIS — M25.512 ACUTE PAIN OF LEFT SHOULDER: Primary | ICD-10-CM

## 2025-01-22 PROCEDURE — 97110 THERAPEUTIC EXERCISES: CPT | Mod: GP | Performed by: PHYSICAL THERAPIST

## 2025-01-22 PROCEDURE — 97140 MANUAL THERAPY 1/> REGIONS: CPT | Mod: GP | Performed by: PHYSICAL THERAPIST

## 2025-01-22 PROCEDURE — 97112 NEUROMUSCULAR REEDUCATION: CPT | Mod: GP | Performed by: PHYSICAL THERAPIST

## 2025-01-28 ENCOUNTER — THERAPY VISIT (OUTPATIENT)
Dept: PHYSICAL THERAPY | Facility: CLINIC | Age: 80
End: 2025-01-28
Payer: COMMERCIAL

## 2025-01-28 DIAGNOSIS — M25.512 ACUTE PAIN OF LEFT SHOULDER: Primary | ICD-10-CM

## 2025-01-28 PROCEDURE — 97110 THERAPEUTIC EXERCISES: CPT | Mod: GP | Performed by: PHYSICAL THERAPIST

## 2025-01-28 PROCEDURE — 97112 NEUROMUSCULAR REEDUCATION: CPT | Mod: GP | Performed by: PHYSICAL THERAPIST

## 2025-01-28 PROCEDURE — 97140 MANUAL THERAPY 1/> REGIONS: CPT | Mod: GP | Performed by: PHYSICAL THERAPIST

## 2025-02-03 ENCOUNTER — THERAPY VISIT (OUTPATIENT)
Dept: PHYSICAL THERAPY | Facility: CLINIC | Age: 80
End: 2025-02-03
Payer: COMMERCIAL

## 2025-02-03 DIAGNOSIS — M25.512 ACUTE PAIN OF LEFT SHOULDER: Primary | ICD-10-CM

## 2025-02-03 PROCEDURE — 97140 MANUAL THERAPY 1/> REGIONS: CPT | Mod: GP

## 2025-02-03 PROCEDURE — 97112 NEUROMUSCULAR REEDUCATION: CPT | Mod: GP

## 2025-02-03 PROCEDURE — 97110 THERAPEUTIC EXERCISES: CPT | Mod: GP

## 2025-02-03 NOTE — PROGRESS NOTES
02/03/25 0500   Appointment Info   Signing clinician's name / credentials Yanely Bragg, PT, DPT   Total/Authorized Visits 6+8   Visits Used 10   Medical Diagnosis left shoulder pain   PT Tx Diagnosis left shoulder pain, possible adhesive capsulitis   Precautions/Limitations left shoulder previous fracture, right shoulder pain   Progress Note/Certification   Start of Care Date 12/04/24   Onset of illness/injury or Date of Surgery 12/03/24  (MD appointment)   Therapy Frequency 1x/ week every other week   Predicted Duration 8 weeks   Certification date from 01/29/25   Certification date to 03/31/25   Progress Note Completed Date 12/31/25   PT Goal 1   Goal Identifier raising arm above head   Goal Description left shoulder flexion 140, abduction 135   Rationale to maximize safety and independence with performance of ADLs and functional tasks;to maximize safety and independence within the home;to maximize safety and independence with self cares   Goal Progress left shoulder flexion 125, abduction 118   Target Date 01/29/25   Subjective Report   Subjective Report Pt reports that the past 3 days, her left shoulder has been feeling really good. Pt reports that she has noticed a decrease in her ROM. Pt also has pain when making right-hand turns and when she is blow drying her hair.   Objective Measures   Objective Measures Objective Measure 1;Objective Measure 2;Objective Measure 3   Objective Measure 1   Objective Measure shoulder ROM   Details left shoulder flexion L 120, R 138,  abduction L 100, R 130, ER B 65 tight on R, IR 50, ext + IR L thumb to L1, R T7.  SL posterior capsule stretch 26.   Objective Measure 2   Objective Measure strength   Details MMT ER 4+/5 left with pain, shoulder flexion L 4/5, R 5/5, scaption L 4/5, R 5/5/.  poor scapular position (scapular winging on L, downward rotation)   Objective Measure 3   Objective Measure posture. extreme tightness in posterior capsule   Details left shoulder  scapular downward rotation, better scpaular position   Treatment Interventions (PT)   Interventions Therapeutic Procedure/Exercise;Neuromuscular Re-education;Manual Therapy   Therapeutic Procedure/Exercise   Therapeutic Procedures: strength, endurance, ROM, flexibility minutes (87450) 18   Therapeutic Procedures Ther Proc 2;Ther Proc 3;Ther Proc 4;Ther Proc 5;Ther Proc 6;Ther Proc 7;Ther Proc 8;Ther Proc 9;Ther Proc 10   Ther Proc 1 AAROM/PROM with gentle end range stretching by therapist   Ther Proc 1 - Details Supine - scaption, flexion and ER   Ther Proc 2 shoulder flexion   Ther Proc 2 - Details not today   Ther Proc 3 Wall slides   Ther Proc 3 - Details x6 into flexion and scaption - no significant increase in pain but did note some tightness and soreness - not added to HEP at this time   Ther Proc 4 Edu on performing walk backs and/or pulley ROM in the morning to decrease stiffness   Ther Proc 6 UBE 1.5 workload   Ther Proc 6 - Details 4 min   PTRx Ther Proc 1 Side-lying Posterior Capsule Stretch   PTRx Ther Proc 1 - Details 30 sec x 2 - cues to have arm angled and pulling arm towards belly button   PTRx Ther Proc 2 Standing Passive Shoulder Flexion   PTRx Ther Proc 2 - Details verbal review   PTRx Ther Proc 3 Four Corner Stretch External Rotation at Side   PTRx Ther Proc 3 - Details verbal review   PTRx Ther Proc 4 Pec Stretch Doorway   PTRx Ther Proc 4 - Details verbal review   PTRx Ther Proc 5 Shoulder AROM IR/ER Supine at 30-40 Degrees Abduction   PTRx Ther Proc 5 - Details verbal review   PTRx Ther Proc 6 Shoulder External Rotation Sidelying   PTRx Ther Proc 6 - Details verbal review   PTRx Ther Proc 7 Shoulder Sidelying Abduction   PTRx Ther Proc 7 - Details verbal review   PTRx Ther Proc 9 Pulley Shoulder Flexion   PTRx Ther Proc 9 - Details verbal review   PTRx Ther Proc 10 Thoracic Rotation   PTRx Ther Proc 10 - Details verbal review   PTRx Ther Proc 11 Supine Lumbar Hip Roll   PTRx Ther Proc 11 -  Details verbal review   PTRx Ther Proc 12 Scapular Retraction/Depression   PTRx Ther Proc 12 - Details 1x10 with 2-3 sec holds - cues for good muscle engagement   PTRx Ther Proc 13 Shoulder Shrugs   PTRx Ther Proc 13 - Details verbal review   PTRx Ther Proc 14 Pendulum/Codmans   PTRx Ther Proc 14 - Details verbal review   Skilled Intervention progression with GH movement and SA control, cueing for posture and no compensation   Patient Response/Progress pain lessens with exercise. Cueing needed for technique. PROM > AROM   PTRx Ther Proc 15 Wand Shoulder External Rotation in Standing   PTRx Ther Proc 15 - Details 1x10 with 5 sec holds - verbal and visual cues for good form and stretch; edu to not let wrist extend with movement   Neuromuscular Re-education   Neuromuscular re-ed of mvmt, balance, coord, kinesthetic sense, posture, proprioception minutes (17853) 10   Neuromuscular Re-education Neuro Re-ed 2;Neuro Re-ed 3;Neuro Re-ed 4   Neuro Re-ed 4 Serratus Punch with Dumbbells   Neuro Re-ed 4 - Details 1x15 B with 1#, 1x30 sec of perturbations left/right, up/down and random   PTRx Neuro Re-ed 1 Rowing with Shoulders Up and Back   PTRx Neuro Re-ed 1 - Details verbal review   PTRx Neuro Re-ed 2 Shoulder Theraband Low Row/Pulldown   PTRx Neuro Re-ed 2 - Details verbal review   PTRx Neuro Re-ed 3 Wall Push Up Plus   PTRx Neuro Re-ed 3 - Details Difficult for pt to engage proper muscles and with proper form - taken from HEP; progressed ceiling punch   PTRx Neuro Re-ed 4 Shoulder Extension in Standing   PTRx Neuro Re-ed 4 - Details verbal review   Skilled Intervention progression with scapular position and control, discussed progression weights and reps   Patient Response/Progress tolerated well   Manual Therapy   Manual Therapy: Mobilization, MFR, MLD, friction massage minutes (30284) 12   Manual Therapy Manual Therapy 2;Manual Therapy 3;Manual Therapy 4   Manual Therapy 1 Posterior and inferior GH jt mobs   Manual  "Therapy 1 - Details Supine, grades III-IV, 1x4 of each   Manual Therapy 2 STM   Manual Therapy 2 - Details UT on L   Manual Therapy 3 scap mobs in R sidelying for L shoulder upward rotation   Manual Therapy 3 - Details not today   Manual Therapy 4 mobs with movement   Manual Therapy 4 - Details flexion, scapation   Skilled Intervention continue with manual and movement to increase GH movement   Patient Response/Progress motion improving slowly   Education   Learner/Method Patient;Demonstration;Pictures/Video;No Barriers to Learning   Plan   Home program PTRX, shanthi on phone   Updates to plan of care added \"plus\" with wall push up,   Plan for next session SUBSCAP release + scapulothoracic mobs into retraction with TP release; UBE, pulleys. Review newly given push up plus at wall, review proper SL ER technique, future add standing shoulder flexion, scaption, MOBS L shoulder inferior, AP glide, STM upper back/neck, SCAP MOBS upward rotation   Comments   Comments Pt returns to PT for 10th session for left shoulder pain. Pt reports that the last 3 days, her shoulder has been feeling good, though she continues to feel some stiffness into the shoulder with driving. Pt demonstrates full PROM into flexion, scaption and ER, though is still limited with AAROM and AROM. Pt continues to feel looser after MT - edu provided to go home and perform PROM to maintain gains from MT. Also discussed performing PROM in the morning (pulleys and/or walk backs) to decrease stiffness. Pt would continue to benefit from skilled PT to address deficits and improve overall function.   Total Session Time   Timed Code Treatment Minutes 40   Total Treatment Time (sum of timed and untimed services) 40       [facility]                                                                                   OUTPATIENT PHYSICAL THERAPY    PLAN OF TREATMENT FOR OUTPATIENT REHABILITATION   Patient's Last Name, First Name, " PRACHI CallMargy bright YOB: 1945   Provider's Name   [facility]   Medical Record No.  [id number]     Onset Date: 12/03/24 (MD appointment)  Start of Care Date: 12/04/24     Medical Diagnosis:  left shoulder pain      PT Treatment Diagnosis:  left shoulder pain, possible adhesive capsulitis Plan of Treatment  Frequency/Duration: 1x/ week every other week/ 8 weeks    Certification date from 01/29/25 to 03/31/25         See note for plan of treatment details and functional goals     Yanely Bragg, JAE                         I CERTIFY THE NEED FOR THESE SERVICES FURNISHED UNDER        THIS PLAN OF TREATMENT AND WHILE UNDER MY CARE .             Physician Signature               Date    X_____________________________________________________                  Referring Provider:  Esteban Melgar    Initial Assessment  See Epic Evaluation- Start of Care Date: 12/04/24      PLAN  Continue 1x/week, decreasing to every other as appropriate for at least 8 weeks    Beginning/End Dates of Progress Note Reporting Period:  12/31/25 to 02/03/2025    Referring Provider:  Esteban Melgar

## 2025-02-13 ENCOUNTER — THERAPY VISIT (OUTPATIENT)
Dept: PHYSICAL THERAPY | Facility: CLINIC | Age: 80
End: 2025-02-13
Payer: COMMERCIAL

## 2025-02-13 DIAGNOSIS — M25.512 ACUTE PAIN OF LEFT SHOULDER: Primary | ICD-10-CM

## 2025-02-20 ENCOUNTER — THERAPY VISIT (OUTPATIENT)
Dept: PHYSICAL THERAPY | Facility: CLINIC | Age: 80
End: 2025-02-20
Payer: COMMERCIAL

## 2025-02-20 DIAGNOSIS — M25.512 ACUTE PAIN OF LEFT SHOULDER: Primary | ICD-10-CM

## 2025-02-24 ENCOUNTER — THERAPY VISIT (OUTPATIENT)
Dept: PHYSICAL THERAPY | Facility: CLINIC | Age: 80
End: 2025-02-24
Payer: COMMERCIAL

## 2025-02-24 DIAGNOSIS — M25.512 ACUTE PAIN OF LEFT SHOULDER: Primary | ICD-10-CM

## 2025-02-24 PROCEDURE — 97110 THERAPEUTIC EXERCISES: CPT | Mod: GP | Performed by: PHYSICAL THERAPIST

## 2025-03-06 ENCOUNTER — THERAPY VISIT (OUTPATIENT)
Dept: PHYSICAL THERAPY | Facility: CLINIC | Age: 80
End: 2025-03-06
Payer: COMMERCIAL

## 2025-03-06 DIAGNOSIS — M25.512 ACUTE PAIN OF LEFT SHOULDER: Primary | ICD-10-CM

## 2025-03-20 ENCOUNTER — THERAPY VISIT (OUTPATIENT)
Dept: PHYSICAL THERAPY | Facility: CLINIC | Age: 80
End: 2025-03-20
Payer: COMMERCIAL

## 2025-03-20 DIAGNOSIS — M25.512 ACUTE PAIN OF LEFT SHOULDER: Primary | ICD-10-CM

## 2025-04-10 ENCOUNTER — THERAPY VISIT (OUTPATIENT)
Dept: PHYSICAL THERAPY | Facility: CLINIC | Age: 80
End: 2025-04-10
Payer: COMMERCIAL

## 2025-04-10 DIAGNOSIS — M25.512 ACUTE PAIN OF LEFT SHOULDER: Primary | ICD-10-CM

## 2025-04-10 NOTE — PROGRESS NOTES
04/10/25 0500   Appointment Info   Signing clinician's name / credentials Aileen Estevez DPT   Total/Authorized Visits 16 (12/3/24 6 visits, 1/7/25 10 visits)   Visits Used 16   Medical Diagnosis left shoulder pain   PT Tx Diagnosis left shoulder pain   Precautions/Limitations left shoulder previous fracture, right shoulder pain   Progress Note/Certification   Start of Care Date 12/04/24   Onset of illness/injury or Date of Surgery 12/03/24  (MD appointment)   Therapy Frequency 2x/month   Predicted Duration 1 month   Certification date from 04/01/25   Certification date to 05/01/25   Progress Note Completed Date 02/03/25   PT Goal 1   Goal Identifier raising arm above head   Goal Description left shoulder flexion 140, abduction 135   Rationale to maximize safety and independence with performance of ADLs and functional tasks;to maximize safety and independence within the home;to maximize safety and independence with self cares   Goal Progress left shoulder flexion 122, abduction 118   Target Date 04/10/25   Subjective Report   Subjective Report 3 week lapse in therapy.  Newer low back strain is now working itself out.  Is trying to avoid bending forward and that helps.   No longer has trouble sleeping but shoulder is stiff when wake up with a little soreness.  Soreness and stiffness works itself out over the course of the day.  Reaching up the back is still painful for L shoulder. Shoulder extension exercise with wand does help to lessen pain. Patient now can drive comfortable but the L arm still gets tired with blow drying her hair.  Feels exercises have helped to get stronger but still overall in life can feel is getting weaker (didn't want to lift 40# potting soil this year.)  Patient feeling can progress with independent HEP.   Objective Measures   Objective Measures Objective Measure 1;Objective Measure 2;Objective Measure 3;Objective Measure 4   Objective Measure 1   Objective Measure shoulder ROM   Details  left shoulder flexion L 122, R 138,  abduction L 118, R 130, ER L 60, R 65, IR 50, ext + IR L L5, R T7.  SL posterior capsule stretch 28.   Objective Measure 2   Objective Measure strength   Details MMT ER 4+/5 left, shoulder flexion L 4+/5, R 5/5, scaption L 4+/5, R 5/5/.  poor scapular position (scapular winging on L, downward rotation)   Objective Measure 3   Objective Measure posture. extreme tightness in posterior capsule, tenderness at L subscap medial border of scap.   Details left shoulder scapular downward rotation, better scapular position   Objective Measure 4   Objective Measure Shoulder Pain and Disability Index improved from 35% to 10%   Details mild (+) neer, (-) barclay, (-) horizontal ADD, (-) speeds, (-) obriens, (-) drop arm, (-) belly press   Treatment Interventions (PT)   Interventions Therapeutic Procedure/Exercise;Neuromuscular Re-education;Manual Therapy   Therapeutic Procedure/Exercise   Therapeutic Procedures: strength, endurance, ROM, flexibility minutes (13883) 33   Therapeutic Procedures Ther Proc 2;Ther Proc 3;Ther Proc 4;Ther Proc 5;Ther Proc 6;Ther Proc 7;Ther Proc 8;Ther Proc 9;Ther Proc 10   Ther Proc 1 Brief discussion of exercise to protect the back, avoid flexion activities for now, keep spine neutral.  Discuss if wants to be treated for the low back then would need MD order.  Briefly discuss posture, avoiding flexion, bend from the knees not low back etc and then discuss all shoulder exercises and how can protect the back throughout them.   Ther Proc 1 - Details Review entire HEP and strengthening principles for progerssing weight as needed/able to progress.  Discuss continue to listen to the body.   Ther Proc 3 AAROM/PROM with gentle end range stretching by therapist.   Ther Proc 3 - Details not today   Ther Proc 4 shoulder ext + IR with pulley   Ther Proc 4 - Details NOT HEP, not today: previously attempted but just increased pain   Ther Proc 6 UBE   Ther Proc 6 - Details 7  min fwd/back 1.5 workload   PTRx Ther Proc 1 Side-lying Posterior Capsule Stretch   PTRx Ther Proc 1 - Details  30 sec x 1   PTRx Ther Proc 2 Standing Passive Shoulder Flexion   PTRx Ther Proc 2 - Details HEP: 10 sec x 2 educate neutral spine (not flexed low back or stagger feet    PTRx Ther Proc 3 Four Corner Stretch External Rotation at Side   PTRx Ther Proc 3 - Details (not today) Patient feels a shift moving into or out of the position.  No pain.  If the shift occurs and is too uncomfortable then stop the exercise.   PTRx Ther Proc 4 Pec Stretch Doorway   PTRx Ther Proc 4 - Details Review HEP: abdominal muscles tight to prevent LBP 10 -15 sec x 2   PTRx Ther Proc 5 Shoulder AROM IR/ER Supine at 30-40 Degrees Abduction   PTRx Ther Proc 5 - Details not today 10 oz group 1   PTRx Ther Proc 6 Shoulder External Rotation Sidelying   PTRx Ther Proc 6 - Details review HEP not today 1# x 10  keep scap retraction/depression throughout.  10 oz x 10.     PTRx Ther Proc 7 Shoulder Sidelying Abduction   PTRx Ther Proc 7 - Details HEP 6 oz   PTRx Ther Proc 8 Serratus Punch with Dumbbells   PTRx Ther Proc 8 - Details review HEP 2# x 10 supine work for serratus and upward rotation of scapula.   PTRx Ther Proc 9 Pulley Shoulder Flexion   PTRx Ther Proc 9 - Details did at home today   PTRx Ther Proc 10 Thoracic Rotation   PTRx Ther Proc 10 - Details HEP   PTRx Ther Proc 11 Scapular Retraction/Depression   PTRx Ther Proc 11 - Details HEP for scap retraction/depression throughout daily activities for best support.  feels good   PTRx Ther Proc 12 Shoulder Shrugs   PTRx Ther Proc 12 - Details HEP   PTRx Ther Proc 13 Pendulum/Codmans   PTRx Ther Proc 13 - Details patient notes this is helpful when has pain continue prn for relief.   PTRx Ther Proc 14 Wand Shoulder Extension Standing   PTRx Ther Proc 14 - Details Review HEP patient states still very helpful and does 3x/day.  x 10 NE/NE just stretching but significantly improves shoulder  ROM after   PTRx Ther Proc 15 Seated Hamstring Stretch   PTRx Ther Proc 15 - Details HEP: 30 sec x 2 keep spine neutral without irritating the low back (keep neutral spine etc)   Skilled Intervention cueing for technique   Patient Response/Progress Repeated shoulder extension significantly improves AROM of L shoulder and less pain after.  Improving shoulder AROM and strength, although continued limitations.   PTRx Ther Proc 16 Shoulder Flexion   PTRx Ther Proc 16 - Details x 10 AG discuss progressions   PTRx Ther Proc 17 Shoulder Scaption Full Can   PTRx Ther Proc 17 - Details x 10 AG   Neuromuscular Re-education   Neuromuscular Re-education Neuro Re-ed 2;Neuro Re-ed 3;Neuro Re-ed 4   Neuro Re-ed 1 Review avoid impinging posture with reaching across body (driving making R turn etc).   PTRx Neuro Re-ed 1 Rowing with Shoulders Up and Back   PTRx Neuro Re-ed 1 - Details HEP: BTB x 10   PTRx Neuro Re-ed 2 Shoulder Theraband Low Row/[place]   PTRx Neuro Re-ed 2 - Details HEP: BTB x 10 focus keep elbows straight get full scap squeeze .     PTRx Neuro Re-ed 3 Shoulder Extension in Standing   PTRx Neuro Re-ed 3 - Details DISCUSS proper posture to prevent back strain (neutral spine with staggered feet).  Patient has not done. 1# x  cue focus on scapula squeeze    PTRx Neuro Re-ed 4 Posture Correction with Lumbar Roll   PTRx Neuro Re-ed 4 - Details Review/HEP:  use of lumbar support in sitting to keep neutral C-T-L spine.   Manual Therapy   Manual Therapy Manual Therapy 2;Manual Therapy 3;Manual Therapy 4   Manual Therapy 1 Posterior and inferior GH jt mobs   Manual Therapy 1 - Details Supine, grades III-IV, not today   Manual Therapy 2 MFR   Manual Therapy 2 - Details subscap release on L in R SL   Manual Therapy 3 scap mobs in R sidelying for L shoulder upward rotation x 3 min   Manual Therapy 4 mobs with movement   Manual Therapy 4 - Details flexion, scaption   Patient Response/Progress NOT TODAY   Education    Learner/Method Patient;Demonstration;Pictures/Video;No Barriers to Learning   Plan   Home program PTRX, shanthi on phone   Plan for next session Discharge with independent HEP   Total Session Time   Timed Code Treatment Minutes 33   Total Treatment Time (sum of timed and untimed services) 33     RE-CERT ONLY NEEDED TO COVER THE 10 DAYS AFTER CERT ENDED, IS DISCHARGING FROM THERAPY.    [facility]                                                                                   OUTPATIENT PHYSICAL THERAPY    PLAN OF TREATMENT FOR OUTPATIENT REHABILITATION   Patient's Last Name, First Name, Margy Foote YOB: 1945   Provider's Name   [facility]   Medical Record No.  [id number]     Onset Date: 12/03/24 (MD appointment)  Start of Care Date: 12/04/24     Medical Diagnosis:  left shoulder pain      PT Treatment Diagnosis:  left shoulder pain Plan of Treatment  Frequency/Duration: 2x/month/ 1 month    Certification date from 04/01/25 to 05/01/25         See note for plan of treatment details and functional goals     Aileen Estevez, PT                         I CERTIFY THE NEED FOR THESE SERVICES FURNISHED UNDER        THIS PLAN OF TREATMENT AND WHILE UNDER MY CARE .             Physician Signature               Date    X_____________________________________________________                  Referring Provider:  Esteban Melgar    Initial Assessment  See Epic Evaluation- Start of Care Date: 12/04/24            DISCHARGE  Reason for Discharge: Patient is improving and confident with HEP    Equipment Issued: theraband    Discharge Plan: Patient to continue home program.    Referring Provider:  Esteban Melgar